# Patient Record
Sex: FEMALE | Race: WHITE | Employment: UNEMPLOYED | URBAN - METROPOLITAN AREA
[De-identification: names, ages, dates, MRNs, and addresses within clinical notes are randomized per-mention and may not be internally consistent; named-entity substitution may affect disease eponyms.]

---

## 2017-04-12 PROBLEM — C49.9 LEIOMYOSARCOMA (HCC): Status: ACTIVE | Noted: 2017-04-12

## 2017-04-13 PROBLEM — Z71.9 ENCOUNTER FOR CONSULTATION: Status: ACTIVE | Noted: 2017-04-13

## 2017-04-13 PROBLEM — Z71.2 ENCOUNTER TO DISCUSS TEST RESULTS: Status: ACTIVE | Noted: 2017-04-13

## 2017-05-08 PROBLEM — C78.7 SECONDARY MALIGNANT NEOPLASM OF LIVER (HCC): Status: ACTIVE | Noted: 2017-05-08

## 2017-05-08 PROBLEM — C79.89: Status: ACTIVE | Noted: 2017-05-08

## 2017-05-18 PROBLEM — Z51.11 ENCOUNTER FOR ANTINEOPLASTIC CHEMOTHERAPY: Status: ACTIVE | Noted: 2017-05-18

## 2017-06-28 PROBLEM — R11.2 CHEMOTHERAPY INDUCED NAUSEA AND VOMITING: Status: ACTIVE | Noted: 2017-06-28

## 2017-06-28 PROBLEM — T45.1X5A CHEMOTHERAPY INDUCED DIARRHEA: Status: ACTIVE | Noted: 2017-06-28

## 2017-06-28 PROBLEM — K52.1 CHEMOTHERAPY INDUCED DIARRHEA: Status: ACTIVE | Noted: 2017-06-28

## 2017-06-28 PROBLEM — T45.1X5A CHEMOTHERAPY INDUCED NAUSEA AND VOMITING: Status: ACTIVE | Noted: 2017-06-28

## 2017-06-28 PROBLEM — E86.0 DEHYDRATION: Status: ACTIVE | Noted: 2017-06-28

## 2017-07-19 PROBLEM — D70.1 CHEMOTHERAPY INDUCED NEUTROPENIA (HCC): Status: ACTIVE | Noted: 2017-07-19

## 2017-07-19 PROBLEM — T45.1X5A CHEMOTHERAPY INDUCED NEUTROPENIA (HCC): Status: ACTIVE | Noted: 2017-07-19

## 2018-02-23 ENCOUNTER — TELEPHONE (OUTPATIENT)
Dept: RHEUMATOLOGY | Age: 63
End: 2018-02-23

## 2018-03-02 ENCOUNTER — TELEPHONE (OUTPATIENT)
Dept: INTERNAL MEDICINE CLINIC | Age: 63
End: 2018-03-02

## 2018-03-02 DIAGNOSIS — E11.9 DIABETES MELLITUS TYPE 2 IN NONOBESE (HCC): Primary | ICD-10-CM

## 2018-03-03 RX ORDER — LANCETS 30 GAUGE
EACH MISCELLANEOUS
Qty: 100 EACH | Refills: 3 | OUTPATIENT
Start: 2018-03-03 | End: 2018-06-08 | Stop reason: SDUPTHER

## 2018-03-03 RX ORDER — GLUCOSAMINE HCL/CHONDROITIN SU 500-400 MG
CAPSULE ORAL
Qty: 100 STRIP | Refills: 5 | OUTPATIENT
Start: 2018-03-03 | End: 2018-06-08 | Stop reason: SDUPTHER

## 2018-03-04 NOTE — TELEPHONE ENCOUNTER
I printed these out as sig would not let me e-scribe them. I think that they can be faxed to her pharmacy.  Thanks saw

## 2018-03-08 RX ORDER — PANTOPRAZOLE SODIUM 40 MG/1
TABLET, DELAYED RELEASE ORAL
Qty: 30 TABLET | Refills: 12 | Status: SHIPPED | OUTPATIENT
Start: 2018-03-08 | End: 2018-12-03

## 2018-04-11 DIAGNOSIS — E11.9 TYPE 2 DIABETES MELLITUS WITHOUT COMPLICATION, WITHOUT LONG-TERM CURRENT USE OF INSULIN (HCC): ICD-10-CM

## 2018-04-12 RX ORDER — GLIPIZIDE 5 MG/1
5 TABLET ORAL
Qty: 180 TABLET | Refills: 1 | Status: SHIPPED | OUTPATIENT
Start: 2018-04-12 | End: 2018-06-08 | Stop reason: ALTCHOICE

## 2018-05-04 ENCOUNTER — TELEPHONE (OUTPATIENT)
Dept: INTERNAL MEDICINE CLINIC | Age: 63
End: 2018-05-04

## 2018-05-22 ENCOUNTER — OFFICE VISIT (OUTPATIENT)
Dept: INTERNAL MEDICINE CLINIC | Age: 63
End: 2018-05-22

## 2018-05-22 VITALS
DIASTOLIC BLOOD PRESSURE: 64 MMHG | BODY MASS INDEX: 24.17 KG/M2 | RESPIRATION RATE: 12 BRPM | HEART RATE: 82 BPM | HEIGHT: 66 IN | SYSTOLIC BLOOD PRESSURE: 106 MMHG | WEIGHT: 150.4 LBS

## 2018-05-22 DIAGNOSIS — M25.512 BILATERAL SHOULDER PAIN, UNSPECIFIED CHRONICITY: ICD-10-CM

## 2018-05-22 DIAGNOSIS — N18.2 TYPE 2 DIABETES MELLITUS WITH STAGE 2 CHRONIC KIDNEY DISEASE, WITH LONG-TERM CURRENT USE OF INSULIN (HCC): ICD-10-CM

## 2018-05-22 DIAGNOSIS — Z79.4 TYPE 2 DIABETES MELLITUS WITH STAGE 2 CHRONIC KIDNEY DISEASE, WITH LONG-TERM CURRENT USE OF INSULIN (HCC): ICD-10-CM

## 2018-05-22 DIAGNOSIS — I10 ESSENTIAL HYPERTENSION: ICD-10-CM

## 2018-05-22 DIAGNOSIS — K76.0 FATTY LIVER: ICD-10-CM

## 2018-05-22 DIAGNOSIS — E78.5 HYPERLIPIDEMIA LDL GOAL <70: ICD-10-CM

## 2018-05-22 DIAGNOSIS — C49.9 LEIOMYOSARCOMA (HCC): Primary | ICD-10-CM

## 2018-05-22 DIAGNOSIS — E03.9 HYPOTHYROIDISM, ADULT: ICD-10-CM

## 2018-05-22 DIAGNOSIS — M25.511 BILATERAL SHOULDER PAIN, UNSPECIFIED CHRONICITY: ICD-10-CM

## 2018-05-22 DIAGNOSIS — E11.22 TYPE 2 DIABETES MELLITUS WITH STAGE 2 CHRONIC KIDNEY DISEASE, WITH LONG-TERM CURRENT USE OF INSULIN (HCC): ICD-10-CM

## 2018-05-22 LAB
CHOLESTEROL, TOTAL: 171 MG/DL (ref 0–199)
HDLC SERPL-MCNC: 57 MG/DL (ref 40–60)
LDL CHOLESTEROL CALCULATED: 87 MG/DL
TRIGL SERPL-MCNC: 137 MG/DL (ref 0–150)
VLDLC SERPL CALC-MCNC: 27 MG/DL

## 2018-05-22 PROCEDURE — 1090F PRES/ABSN URINE INCON ASSESS: CPT | Performed by: INTERNAL MEDICINE

## 2018-05-22 PROCEDURE — G8428 CUR MEDS NOT DOCUMENT: HCPCS | Performed by: INTERNAL MEDICINE

## 2018-05-22 PROCEDURE — 4040F PNEUMOC VAC/ADMIN/RCVD: CPT | Performed by: INTERNAL MEDICINE

## 2018-05-22 PROCEDURE — 3046F HEMOGLOBIN A1C LEVEL >9.0%: CPT | Performed by: INTERNAL MEDICINE

## 2018-05-22 PROCEDURE — 1123F ACP DISCUSS/DSCN MKR DOCD: CPT | Performed by: INTERNAL MEDICINE

## 2018-05-22 PROCEDURE — 1036F TOBACCO NON-USER: CPT | Performed by: INTERNAL MEDICINE

## 2018-05-22 PROCEDURE — 99215 OFFICE O/P EST HI 40 MIN: CPT | Performed by: INTERNAL MEDICINE

## 2018-05-22 PROCEDURE — 3017F COLORECTAL CA SCREEN DOC REV: CPT | Performed by: INTERNAL MEDICINE

## 2018-05-22 PROCEDURE — G8400 PT W/DXA NO RESULTS DOC: HCPCS | Performed by: INTERNAL MEDICINE

## 2018-05-22 PROCEDURE — G8420 CALC BMI NORM PARAMETERS: HCPCS | Performed by: INTERNAL MEDICINE

## 2018-05-22 PROCEDURE — 2022F DILAT RTA XM EVC RTNOPTHY: CPT | Performed by: INTERNAL MEDICINE

## 2018-05-22 RX ORDER — PREDNISONE 10 MG/1
10 TABLET ORAL DAILY
Qty: 90 TABLET | Refills: 3 | Status: SHIPPED | OUTPATIENT
Start: 2018-05-22 | End: 2019-06-18 | Stop reason: SDUPTHER

## 2018-05-22 RX ORDER — LEVOTHYROXINE SODIUM 125 MCG
125 TABLET ORAL
Qty: 90 TABLET | Refills: 1 | Status: SHIPPED | OUTPATIENT
Start: 2018-05-22 | End: 2018-12-03 | Stop reason: SDUPTHER

## 2018-05-22 ASSESSMENT — PATIENT HEALTH QUESTIONNAIRE - PHQ9
SUM OF ALL RESPONSES TO PHQ9 QUESTIONS 1 & 2: 0
1. LITTLE INTEREST OR PLEASURE IN DOING THINGS: 0
2. FEELING DOWN, DEPRESSED OR HOPELESS: 0
SUM OF ALL RESPONSES TO PHQ QUESTIONS 1-9: 0

## 2018-05-22 ASSESSMENT — ENCOUNTER SYMPTOMS
CONSTIPATION: 0
CHEST TIGHTNESS: 0
BACK PAIN: 0
SHORTNESS OF BREATH: 0
DIARRHEA: 0

## 2018-05-23 LAB
ESTIMATED AVERAGE GLUCOSE: 251.8 MG/DL
HBA1C MFR BLD: 10.4 %

## 2018-06-08 ENCOUNTER — OFFICE VISIT (OUTPATIENT)
Dept: INTERNAL MEDICINE CLINIC | Age: 63
End: 2018-06-08

## 2018-06-08 VITALS
BODY MASS INDEX: 24.58 KG/M2 | DIASTOLIC BLOOD PRESSURE: 68 MMHG | RESPIRATION RATE: 12 BRPM | HEART RATE: 82 BPM | SYSTOLIC BLOOD PRESSURE: 118 MMHG | WEIGHT: 150 LBS

## 2018-06-08 DIAGNOSIS — E11.9 DIABETES MELLITUS TYPE 2 IN NONOBESE (HCC): ICD-10-CM

## 2018-06-08 DIAGNOSIS — E11.22 TYPE 2 DIABETES MELLITUS WITH STAGE 2 CHRONIC KIDNEY DISEASE, WITH LONG-TERM CURRENT USE OF INSULIN (HCC): Primary | ICD-10-CM

## 2018-06-08 DIAGNOSIS — C49.9 LEIOMYOSARCOMA (HCC): ICD-10-CM

## 2018-06-08 DIAGNOSIS — Z79.4 TYPE 2 DIABETES MELLITUS WITH STAGE 2 CHRONIC KIDNEY DISEASE, WITH LONG-TERM CURRENT USE OF INSULIN (HCC): Primary | ICD-10-CM

## 2018-06-08 DIAGNOSIS — M25.511 BILATERAL SHOULDER PAIN, UNSPECIFIED CHRONICITY: ICD-10-CM

## 2018-06-08 DIAGNOSIS — M25.512 BILATERAL SHOULDER PAIN, UNSPECIFIED CHRONICITY: ICD-10-CM

## 2018-06-08 DIAGNOSIS — N18.2 TYPE 2 DIABETES MELLITUS WITH STAGE 2 CHRONIC KIDNEY DISEASE, WITH LONG-TERM CURRENT USE OF INSULIN (HCC): Primary | ICD-10-CM

## 2018-06-08 PROCEDURE — 3046F HEMOGLOBIN A1C LEVEL >9.0%: CPT | Performed by: INTERNAL MEDICINE

## 2018-06-08 PROCEDURE — 99214 OFFICE O/P EST MOD 30 MIN: CPT | Performed by: INTERNAL MEDICINE

## 2018-06-08 PROCEDURE — 4040F PNEUMOC VAC/ADMIN/RCVD: CPT | Performed by: INTERNAL MEDICINE

## 2018-06-08 PROCEDURE — 1090F PRES/ABSN URINE INCON ASSESS: CPT | Performed by: INTERNAL MEDICINE

## 2018-06-08 PROCEDURE — 3017F COLORECTAL CA SCREEN DOC REV: CPT | Performed by: INTERNAL MEDICINE

## 2018-06-08 PROCEDURE — G8400 PT W/DXA NO RESULTS DOC: HCPCS | Performed by: INTERNAL MEDICINE

## 2018-06-08 PROCEDURE — G8427 DOCREV CUR MEDS BY ELIG CLIN: HCPCS | Performed by: INTERNAL MEDICINE

## 2018-06-08 PROCEDURE — G8420 CALC BMI NORM PARAMETERS: HCPCS | Performed by: INTERNAL MEDICINE

## 2018-06-08 PROCEDURE — 2022F DILAT RTA XM EVC RTNOPTHY: CPT | Performed by: INTERNAL MEDICINE

## 2018-06-08 PROCEDURE — 1036F TOBACCO NON-USER: CPT | Performed by: INTERNAL MEDICINE

## 2018-06-08 PROCEDURE — 1123F ACP DISCUSS/DSCN MKR DOCD: CPT | Performed by: INTERNAL MEDICINE

## 2018-06-08 RX ORDER — GLUCOSAMINE HCL/CHONDROITIN SU 500-400 MG
CAPSULE ORAL
Qty: 150 STRIP | Refills: 5 | Status: SHIPPED | OUTPATIENT
Start: 2018-06-08 | End: 2018-09-18 | Stop reason: SDUPTHER

## 2018-06-08 RX ORDER — LANCETS 30 GAUGE
EACH MISCELLANEOUS
Qty: 100 EACH | Refills: 3 | Status: SHIPPED | OUTPATIENT
Start: 2018-06-08 | End: 2018-06-08 | Stop reason: DRUGHIGH

## 2018-06-08 RX ORDER — GLUCOSAMINE HCL/CHONDROITIN SU 500-400 MG
CAPSULE ORAL
Qty: 100 STRIP | Refills: 5 | Status: SHIPPED | OUTPATIENT
Start: 2018-06-08 | End: 2018-06-08 | Stop reason: DRUGHIGH

## 2018-06-08 RX ORDER — LANCETS 30 GAUGE
EACH MISCELLANEOUS
Qty: 150 EACH | Refills: 3 | Status: SHIPPED | OUTPATIENT
Start: 2018-06-08

## 2018-06-22 ENCOUNTER — OFFICE VISIT (OUTPATIENT)
Dept: INTERNAL MEDICINE CLINIC | Age: 63
End: 2018-06-22

## 2018-06-22 VITALS
HEART RATE: 80 BPM | DIASTOLIC BLOOD PRESSURE: 64 MMHG | RESPIRATION RATE: 12 BRPM | BODY MASS INDEX: 26.06 KG/M2 | WEIGHT: 159 LBS | SYSTOLIC BLOOD PRESSURE: 110 MMHG

## 2018-06-22 DIAGNOSIS — G56.03 CARPAL TUNNEL SYNDROME, BILATERAL: ICD-10-CM

## 2018-06-22 DIAGNOSIS — E11.22 TYPE 2 DIABETES MELLITUS WITH STAGE 2 CHRONIC KIDNEY DISEASE, WITH LONG-TERM CURRENT USE OF INSULIN (HCC): Primary | ICD-10-CM

## 2018-06-22 DIAGNOSIS — Z79.4 TYPE 2 DIABETES MELLITUS WITH STAGE 2 CHRONIC KIDNEY DISEASE, WITH LONG-TERM CURRENT USE OF INSULIN (HCC): Primary | ICD-10-CM

## 2018-06-22 DIAGNOSIS — R10.9 ACUTE RIGHT FLANK PAIN: ICD-10-CM

## 2018-06-22 DIAGNOSIS — N18.2 TYPE 2 DIABETES MELLITUS WITH STAGE 2 CHRONIC KIDNEY DISEASE, WITH LONG-TERM CURRENT USE OF INSULIN (HCC): Primary | ICD-10-CM

## 2018-06-22 LAB
BILIRUBIN, POC: ABNORMAL
BLOOD URINE, POC: NEGATIVE
CLARITY, POC: CLEAR
COLOR, POC: YELLOW
GLUCOSE URINE, POC: >2000
KETONES, POC: NEGATIVE
LEUKOCYTE EST, POC: NEGATIVE
NITRITE, POC: NEGATIVE
PH, POC: 5
PROTEIN, POC: NEGATIVE
SPECIFIC GRAVITY, POC: 1.01
UROBILINOGEN, POC: 0.2

## 2018-06-22 PROCEDURE — 1036F TOBACCO NON-USER: CPT | Performed by: INTERNAL MEDICINE

## 2018-06-22 PROCEDURE — G8400 PT W/DXA NO RESULTS DOC: HCPCS | Performed by: INTERNAL MEDICINE

## 2018-06-22 PROCEDURE — 99214 OFFICE O/P EST MOD 30 MIN: CPT | Performed by: INTERNAL MEDICINE

## 2018-06-22 PROCEDURE — 3046F HEMOGLOBIN A1C LEVEL >9.0%: CPT | Performed by: INTERNAL MEDICINE

## 2018-06-22 PROCEDURE — 1123F ACP DISCUSS/DSCN MKR DOCD: CPT | Performed by: INTERNAL MEDICINE

## 2018-06-22 PROCEDURE — 82962 GLUCOSE BLOOD TEST: CPT | Performed by: INTERNAL MEDICINE

## 2018-06-22 PROCEDURE — 3017F COLORECTAL CA SCREEN DOC REV: CPT | Performed by: INTERNAL MEDICINE

## 2018-06-22 PROCEDURE — 4040F PNEUMOC VAC/ADMIN/RCVD: CPT | Performed by: INTERNAL MEDICINE

## 2018-06-22 PROCEDURE — 81002 URINALYSIS NONAUTO W/O SCOPE: CPT | Performed by: INTERNAL MEDICINE

## 2018-06-22 PROCEDURE — G8417 CALC BMI ABV UP PARAM F/U: HCPCS | Performed by: INTERNAL MEDICINE

## 2018-06-22 PROCEDURE — 1090F PRES/ABSN URINE INCON ASSESS: CPT | Performed by: INTERNAL MEDICINE

## 2018-06-22 PROCEDURE — G8427 DOCREV CUR MEDS BY ELIG CLIN: HCPCS | Performed by: INTERNAL MEDICINE

## 2018-06-22 PROCEDURE — 2022F DILAT RTA XM EVC RTNOPTHY: CPT | Performed by: INTERNAL MEDICINE

## 2018-06-23 PROBLEM — R10.9 ACUTE RIGHT FLANK PAIN: Status: ACTIVE | Noted: 2018-06-23

## 2018-06-23 PROBLEM — G56.03 CARPAL TUNNEL SYNDROME, BILATERAL: Status: ACTIVE | Noted: 2018-06-23

## 2018-06-23 ASSESSMENT — ENCOUNTER SYMPTOMS
NAUSEA: 0
BLOOD IN STOOL: 0
COUGH: 0
CONSTIPATION: 1
SHORTNESS OF BREATH: 0
DIARRHEA: 0
VOMITING: 0

## 2018-08-01 RX ORDER — EMPAGLIFLOZIN 25 MG/1
TABLET, FILM COATED ORAL
Qty: 30 TABLET | Refills: 4 | Status: SHIPPED | OUTPATIENT
Start: 2018-08-01 | End: 2018-12-27 | Stop reason: SDUPTHER

## 2018-09-04 ENCOUNTER — OFFICE VISIT (OUTPATIENT)
Dept: INTERNAL MEDICINE CLINIC | Age: 63
End: 2018-09-04

## 2018-09-04 ENCOUNTER — TELEPHONE (OUTPATIENT)
Dept: INTERNAL MEDICINE CLINIC | Age: 63
End: 2018-09-04

## 2018-09-04 VITALS
WEIGHT: 163 LBS | BODY MASS INDEX: 26.71 KG/M2 | RESPIRATION RATE: 12 BRPM | HEART RATE: 72 BPM | SYSTOLIC BLOOD PRESSURE: 132 MMHG | DIASTOLIC BLOOD PRESSURE: 70 MMHG

## 2018-09-04 DIAGNOSIS — Z79.4 TYPE 2 DIABETES MELLITUS WITH STAGE 2 CHRONIC KIDNEY DISEASE, WITH LONG-TERM CURRENT USE OF INSULIN (HCC): Primary | ICD-10-CM

## 2018-09-04 DIAGNOSIS — E11.22 TYPE 2 DIABETES MELLITUS WITH STAGE 2 CHRONIC KIDNEY DISEASE, WITH LONG-TERM CURRENT USE OF INSULIN (HCC): Primary | ICD-10-CM

## 2018-09-04 DIAGNOSIS — M54.6 ACUTE BILATERAL THORACIC BACK PAIN: ICD-10-CM

## 2018-09-04 DIAGNOSIS — N18.2 TYPE 2 DIABETES MELLITUS WITH STAGE 2 CHRONIC KIDNEY DISEASE, WITH LONG-TERM CURRENT USE OF INSULIN (HCC): Primary | ICD-10-CM

## 2018-09-04 DIAGNOSIS — H00.014 HORDEOLUM EXTERNUM OF LEFT UPPER EYELID: ICD-10-CM

## 2018-09-04 DIAGNOSIS — E78.5 HYPERLIPIDEMIA LDL GOAL <70: ICD-10-CM

## 2018-09-04 DIAGNOSIS — C49.9 LEIOMYOSARCOMA (HCC): ICD-10-CM

## 2018-09-04 PROCEDURE — 1101F PT FALLS ASSESS-DOCD LE1/YR: CPT | Performed by: INTERNAL MEDICINE

## 2018-09-04 PROCEDURE — 1123F ACP DISCUSS/DSCN MKR DOCD: CPT | Performed by: INTERNAL MEDICINE

## 2018-09-04 PROCEDURE — 99214 OFFICE O/P EST MOD 30 MIN: CPT | Performed by: INTERNAL MEDICINE

## 2018-09-04 PROCEDURE — 1036F TOBACCO NON-USER: CPT | Performed by: INTERNAL MEDICINE

## 2018-09-04 PROCEDURE — 1090F PRES/ABSN URINE INCON ASSESS: CPT | Performed by: INTERNAL MEDICINE

## 2018-09-04 PROCEDURE — 3017F COLORECTAL CA SCREEN DOC REV: CPT | Performed by: INTERNAL MEDICINE

## 2018-09-04 PROCEDURE — G8400 PT W/DXA NO RESULTS DOC: HCPCS | Performed by: INTERNAL MEDICINE

## 2018-09-04 PROCEDURE — 4040F PNEUMOC VAC/ADMIN/RCVD: CPT | Performed by: INTERNAL MEDICINE

## 2018-09-04 PROCEDURE — 3046F HEMOGLOBIN A1C LEVEL >9.0%: CPT | Performed by: INTERNAL MEDICINE

## 2018-09-04 PROCEDURE — G8427 DOCREV CUR MEDS BY ELIG CLIN: HCPCS | Performed by: INTERNAL MEDICINE

## 2018-09-04 PROCEDURE — G8417 CALC BMI ABV UP PARAM F/U: HCPCS | Performed by: INTERNAL MEDICINE

## 2018-09-04 PROCEDURE — 2022F DILAT RTA XM EVC RTNOPTHY: CPT | Performed by: INTERNAL MEDICINE

## 2018-09-04 RX ORDER — FLASH GLUCOSE SENSOR
KIT MISCELLANEOUS
Qty: 1 DEVICE | Refills: 5 | Status: SHIPPED | OUTPATIENT
Start: 2018-09-04 | End: 2018-09-12 | Stop reason: SDUPTHER

## 2018-09-04 RX ORDER — FLASH GLUCOSE SENSOR
KIT MISCELLANEOUS
Qty: 4 EACH | Refills: 5 | Status: SHIPPED | OUTPATIENT
Start: 2018-09-04 | End: 2018-09-11 | Stop reason: SDUPTHER

## 2018-09-04 RX ORDER — TIZANIDINE 2 MG/1
TABLET ORAL
Qty: 300 TABLET | Refills: 0 | OUTPATIENT
Start: 2018-09-04

## 2018-09-04 RX ORDER — TIZANIDINE 2 MG/1
2 TABLET ORAL EVERY 8 HOURS PRN
Qty: 20 TABLET | Refills: 0 | Status: SHIPPED | OUTPATIENT
Start: 2018-09-04 | End: 2019-06-02

## 2018-09-04 ASSESSMENT — ENCOUNTER SYMPTOMS
DIARRHEA: 0
SHORTNESS OF BREATH: 0
CONSTIPATION: 0
CHEST TIGHTNESS: 0
BACK PAIN: 1

## 2018-09-04 NOTE — PATIENT INSTRUCTIONS
doctor. Follow-up care is a key part of your treatment and safety. Be sure to make and go to all appointments, and call your doctor if you are having problems. It's also a good idea to know your test results and keep a list of the medicines you take. How can you care for yourself at home? · Do not rub your eyes. Do not squeeze or try to open a stye or chalazion. · To help a stye or chalazion heal faster:  ¨ Put a warm, moist compress on your eye for 5 to 10 minutes, 3 to 6 times a day. Heat often brings a stye to a point where it drains on its own. Keep in mind that warm compresses will often increase swelling a little at first.  ¨ Do not use hot water or heat a wet cloth in a microwave oven. The compress may get too hot and can burn the eyelid. · Always wash your hands before and after you use a compress or touch your eyes. · If the doctor gave you antibiotic drops or ointment, use the medicine exactly as directed. Use the medicine for as long as instructed, even if your eye starts to feel better. · To put in eyedrops or ointment:  ¨ Tilt your head back, and pull your lower eyelid down with one finger. ¨ Drop or squirt the medicine inside the lower lid. ¨ Close your eye for 30 to 60 seconds to let the drops or ointment move around. ¨ Do not touch the ointment or dropper tip to your eyelashes or any other surface. · Do not wear eye makeup or contact lenses until the stye or chalazion heals. · Do not share towels, pillows, or washcloths while you have a stye. When should you call for help? Call your doctor now or seek immediate medical care if:    · You have pain in your eye.     · You have a change in vision or loss of vision.     · Redness and swelling get much worse.    Watch closely for changes in your health, and be sure to contact your doctor if:    · Your stye does not get better in 1 week.     · Your chalazion does not start to get better after several weeks. Where can you learn more?   Go to against a wall with your feet about 12 to 24 inches back from the wall. If you feel any pain when you do this exercise, stand closer to the wall. 2. Place your hands on the wall slightly wider apart than your shoulders, and lean forward. 3. Gently lean your body toward the wall. Then push back to your starting position. Keep the motion smooth and controlled. 4. Repeat 8 to 12 times. Resisted shoulder blade squeeze    For this exercise, you will need elastic exercise material, such as surgical tubing or Thera-Band. 1. Sit or stand, holding the band in both hands in front of you. Keep your elbows close to your sides, bent at a 90-degree angle. Your palms should face up. 2. Squeeze your shoulder blades together, and move your arms to the outside, stretching the band. Be sure to keep your elbows at your sides while you do this. 3. Relax. 4. Repeat 8 to 12 times. Resisted rows    For this exercise, you will need elastic exercise material, such as surgical tubing or Thera-Band. 1. Put the band around a solid object, such as a bedpost, at about waist level. Hold one end of the band in each hand. 2. With your elbows at your sides and bent to 90 degrees, pull the band back to move your shoulder blades toward each other. Return to the starting position. 3. Repeat 8 to 12 times. Follow-up care is a key part of your treatment and safety. Be sure to make and go to all appointments, and call your doctor if you are having problems. It's also a good idea to know your test results and keep a list of the medicines you take. Where can you learn more? Go to [email or web address] [email or web address] org and sign in to your [employer] account. Enter N107 in the [employer] box to learn more about \"Healthy Upper Back: Exercises. \"     If you do not have an account, please click on the \"Sign Up Now\" link. Current as of: November 29, 2017  Content Version: 11.7  © 4821-6678 [employer].  Care instructions adapted under license by [facility] ([facility]). If you have questions about a medical condition or this instruction, always ask your healthcare professional. [address] any warranty or liability for your use of this information.

## 2018-09-04 NOTE — ASSESSMENT & PLAN NOTE
HbA1C done at oncology, awaiting labs. Is having lows so will adjust insulin as follows:  Continue Toujeo 20 units qhs unless 150, then take only 15 units, under 100 take 10 units and eat a snack.    Adjust Humalog:  IIf sugar is under 100, No insulin  If sugars is 100-120, take ONLY 5 units of Humalog when you eat  If sugar is 120-150 take 8 total units of Humalog  If sugar is 150-200   10 units of Humalog                        200-250                              12 units                       250-300                              14 units                                   300-350   18 units                  350-400                  20units                                    400-450   22 units

## 2018-09-09 NOTE — ASSESSMENT & PLAN NOTE
Lipid panel was not drawn with recent labs from oncology. Will order with next set of labs. Not clear that she is still getting benefit from statin.

## 2018-09-09 NOTE — ASSESSMENT & PLAN NOTE
Will try tizanidine. She is already on oxycodone from oncology. Advised not to mix oxycodone and tizanidine to avoid over-sedation. Also recommend massage therapy as tolerated.

## 2018-09-09 NOTE — ASSESSMENT & PLAN NOTE
Chemotherapy has more or less halted the growth of some tumors while others have grown. Is relatively stable. Is continuing on her current chemotherapy plan with next set of scans scheduled for October.

## 2018-09-10 RX ORDER — FENOFIBRATE 160 MG/1
TABLET ORAL
Qty: 90 TABLET | Refills: 1 | Status: SHIPPED | OUTPATIENT
Start: 2018-09-10 | End: 2018-12-03 | Stop reason: SDUPTHER

## 2018-09-11 ENCOUNTER — TELEPHONE (OUTPATIENT)
Dept: INTERNAL MEDICINE CLINIC | Age: 63
End: 2018-09-11

## 2018-09-11 DIAGNOSIS — Z79.4 TYPE 2 DIABETES MELLITUS WITH STAGE 2 CHRONIC KIDNEY DISEASE, WITH LONG-TERM CURRENT USE OF INSULIN (HCC): ICD-10-CM

## 2018-09-11 DIAGNOSIS — N18.2 TYPE 2 DIABETES MELLITUS WITH STAGE 2 CHRONIC KIDNEY DISEASE, WITH LONG-TERM CURRENT USE OF INSULIN (HCC): ICD-10-CM

## 2018-09-11 DIAGNOSIS — E11.22 TYPE 2 DIABETES MELLITUS WITH STAGE 2 CHRONIC KIDNEY DISEASE, WITH LONG-TERM CURRENT USE OF INSULIN (HCC): ICD-10-CM

## 2018-09-11 RX ORDER — FLASH GLUCOSE SENSOR
KIT MISCELLANEOUS
Qty: 4 EACH | Refills: 5 | Status: SHIPPED | OUTPATIENT
Start: 2018-09-11 | End: 2018-09-12 | Stop reason: SDUPTHER

## 2018-09-11 NOTE — TELEPHONE ENCOUNTER
[facility] in [address] [facility] calling about the Free Style Gluc ---medicare does not cover it and it is expensive----they wanted to know if you wanted something different or does the pt want to pay for or does she really need. Please let them know. Thanks.

## 2018-09-12 ENCOUNTER — TELEPHONE (OUTPATIENT)
Dept: INTERNAL MEDICINE CLINIC | Age: 63
End: 2018-09-12

## 2018-09-12 DIAGNOSIS — N18.2 TYPE 2 DIABETES MELLITUS WITH STAGE 2 CHRONIC KIDNEY DISEASE, WITH LONG-TERM CURRENT USE OF INSULIN (HCC): Primary | ICD-10-CM

## 2018-09-12 DIAGNOSIS — Z79.4 TYPE 2 DIABETES MELLITUS WITH STAGE 2 CHRONIC KIDNEY DISEASE, WITH LONG-TERM CURRENT USE OF INSULIN (HCC): Primary | ICD-10-CM

## 2018-09-12 DIAGNOSIS — E11.22 TYPE 2 DIABETES MELLITUS WITH STAGE 2 CHRONIC KIDNEY DISEASE, WITH LONG-TERM CURRENT USE OF INSULIN (HCC): Primary | ICD-10-CM

## 2018-09-12 RX ORDER — FLASH GLUCOSE SENSOR
KIT MISCELLANEOUS
Qty: 4 EACH | Refills: 5 | Status: SHIPPED | OUTPATIENT
Start: 2018-09-12 | End: 2019-05-26 | Stop reason: SDUPTHER

## 2018-09-12 RX ORDER — FLASH GLUCOSE SENSOR
KIT MISCELLANEOUS
Qty: 1 DEVICE | Refills: 0 | Status: SHIPPED | OUTPATIENT
Start: 2018-09-12 | End: 2018-09-14 | Stop reason: SDUPTHER

## 2018-09-12 NOTE — TELEPHONE ENCOUNTER
Pt called back again about the freestyle mikey device   She is wanting it called to [facility] in [facility] and said that it needs to be ordered in 3 parts? ???

## 2018-09-14 RX ORDER — FLASH GLUCOSE SENSOR
KIT MISCELLANEOUS
Qty: 1 DEVICE | Refills: 0 | Status: SHIPPED | OUTPATIENT
Start: 2018-09-14

## 2018-09-18 ENCOUNTER — TELEPHONE (OUTPATIENT)
Dept: INTERNAL MEDICINE CLINIC | Age: 63
End: 2018-09-18

## 2018-09-18 DIAGNOSIS — E11.9 DIABETES MELLITUS TYPE 2 IN NONOBESE (HCC): ICD-10-CM

## 2018-09-18 NOTE — TELEPHONE ENCOUNTER
Pt calling needs One Touch Ultra blue test strips ----she does 4 times a day---please send to [facility] [place]---Thanks.

## 2018-09-19 RX ORDER — GLUCOSAMINE HCL/CHONDROITIN SU 500-400 MG
CAPSULE ORAL
Qty: 150 STRIP | Refills: 5 | Status: SHIPPED | OUTPATIENT
Start: 2018-09-19

## 2018-09-26 PROBLEM — Z71.2 ENCOUNTER TO DISCUSS TEST RESULTS: Status: RESOLVED | Noted: 2017-04-13 | Resolved: 2018-09-26

## 2018-09-26 PROBLEM — E86.0 DEHYDRATION: Status: RESOLVED | Noted: 2017-06-28 | Resolved: 2018-09-26

## 2018-09-26 PROBLEM — Z51.11 ENCOUNTER FOR ANTINEOPLASTIC CHEMOTHERAPY: Status: RESOLVED | Noted: 2017-05-18 | Resolved: 2018-09-26

## 2018-10-12 RX ORDER — NABUMETONE 500 MG/1
TABLET, FILM COATED ORAL
Qty: 60 TABLET | Refills: 2 | Status: SHIPPED | OUTPATIENT
Start: 2018-10-12 | End: 2018-12-11 | Stop reason: SDUPTHER

## 2018-12-03 ENCOUNTER — OFFICE VISIT (OUTPATIENT)
Dept: INTERNAL MEDICINE CLINIC | Age: 63
End: 2018-12-03
Payer: MEDICARE

## 2018-12-03 VITALS
WEIGHT: 156.8 LBS | BODY MASS INDEX: 25.7 KG/M2 | HEART RATE: 82 BPM | RESPIRATION RATE: 12 BRPM | SYSTOLIC BLOOD PRESSURE: 144 MMHG | DIASTOLIC BLOOD PRESSURE: 80 MMHG

## 2018-12-03 DIAGNOSIS — E03.9 HYPOTHYROIDISM, ADULT: ICD-10-CM

## 2018-12-03 DIAGNOSIS — N18.2 TYPE 2 DIABETES MELLITUS WITH STAGE 2 CHRONIC KIDNEY DISEASE, WITH LONG-TERM CURRENT USE OF INSULIN (HCC): Primary | ICD-10-CM

## 2018-12-03 DIAGNOSIS — H00.014 HORDEOLUM EXTERNUM OF LEFT UPPER EYELID: ICD-10-CM

## 2018-12-03 DIAGNOSIS — E78.5 HYPERLIPIDEMIA LDL GOAL <70: ICD-10-CM

## 2018-12-03 DIAGNOSIS — I10 ESSENTIAL HYPERTENSION: ICD-10-CM

## 2018-12-03 DIAGNOSIS — Z79.4 TYPE 2 DIABETES MELLITUS WITH STAGE 2 CHRONIC KIDNEY DISEASE, WITH LONG-TERM CURRENT USE OF INSULIN (HCC): Primary | ICD-10-CM

## 2018-12-03 DIAGNOSIS — C49.9 LEIOMYOSARCOMA (HCC): ICD-10-CM

## 2018-12-03 DIAGNOSIS — E11.22 TYPE 2 DIABETES MELLITUS WITH STAGE 2 CHRONIC KIDNEY DISEASE, WITH LONG-TERM CURRENT USE OF INSULIN (HCC): Primary | ICD-10-CM

## 2018-12-03 LAB
GLUCOSE BLD-MCNC: 227 MG/DL
HBA1C MFR BLD: 8.4 %

## 2018-12-03 PROCEDURE — G8400 PT W/DXA NO RESULTS DOC: HCPCS | Performed by: INTERNAL MEDICINE

## 2018-12-03 PROCEDURE — 3045F PR MOST RECENT HEMOGLOBIN A1C LEVEL 7.0-9.0%: CPT | Performed by: INTERNAL MEDICINE

## 2018-12-03 PROCEDURE — 2022F DILAT RTA XM EVC RTNOPTHY: CPT | Performed by: INTERNAL MEDICINE

## 2018-12-03 PROCEDURE — 1101F PT FALLS ASSESS-DOCD LE1/YR: CPT | Performed by: INTERNAL MEDICINE

## 2018-12-03 PROCEDURE — 99214 OFFICE O/P EST MOD 30 MIN: CPT | Performed by: INTERNAL MEDICINE

## 2018-12-03 PROCEDURE — G8427 DOCREV CUR MEDS BY ELIG CLIN: HCPCS | Performed by: INTERNAL MEDICINE

## 2018-12-03 PROCEDURE — G8482 FLU IMMUNIZE ORDER/ADMIN: HCPCS | Performed by: INTERNAL MEDICINE

## 2018-12-03 PROCEDURE — 4040F PNEUMOC VAC/ADMIN/RCVD: CPT | Performed by: INTERNAL MEDICINE

## 2018-12-03 PROCEDURE — 83036 HEMOGLOBIN GLYCOSYLATED A1C: CPT | Performed by: INTERNAL MEDICINE

## 2018-12-03 PROCEDURE — G8417 CALC BMI ABV UP PARAM F/U: HCPCS | Performed by: INTERNAL MEDICINE

## 2018-12-03 PROCEDURE — 1036F TOBACCO NON-USER: CPT | Performed by: INTERNAL MEDICINE

## 2018-12-03 PROCEDURE — 1123F ACP DISCUSS/DSCN MKR DOCD: CPT | Performed by: INTERNAL MEDICINE

## 2018-12-03 PROCEDURE — 3017F COLORECTAL CA SCREEN DOC REV: CPT | Performed by: INTERNAL MEDICINE

## 2018-12-03 PROCEDURE — 82962 GLUCOSE BLOOD TEST: CPT | Performed by: INTERNAL MEDICINE

## 2018-12-03 PROCEDURE — 1090F PRES/ABSN URINE INCON ASSESS: CPT | Performed by: INTERNAL MEDICINE

## 2018-12-03 RX ORDER — FENOFIBRATE 160 MG/1
TABLET ORAL
Qty: 90 TABLET | Refills: 1 | Status: SHIPPED | OUTPATIENT
Start: 2018-12-03 | End: 2019-05-25 | Stop reason: SDUPTHER

## 2018-12-03 RX ORDER — LEVOTHYROXINE SODIUM 125 MCG
125 TABLET ORAL
Qty: 90 TABLET | Refills: 1 | Status: ON HOLD | OUTPATIENT
Start: 2018-12-03 | End: 2019-06-05 | Stop reason: SDUPTHER

## 2018-12-03 RX ORDER — TRIAMCINOLONE ACETONIDE 5 MG/G
CREAM TOPICAL
Qty: 45 G | Refills: 1 | Status: SHIPPED | OUTPATIENT
Start: 2018-12-03 | End: 2018-12-10

## 2018-12-03 RX ORDER — BACITRACIN ZINC AND POLYMYXIN B SULFATE 500; 10000 [USP'U]/G; [USP'U]/G
0.5 OINTMENT OPHTHALMIC 2 TIMES DAILY
Qty: 1 TUBE | Refills: 1 | Status: SHIPPED | OUTPATIENT
Start: 2018-12-03 | End: 2018-12-13

## 2018-12-03 ASSESSMENT — ENCOUNTER SYMPTOMS
CONSTIPATION: 0
CHEST TIGHTNESS: 0
DIARRHEA: 0
SHORTNESS OF BREATH: 0

## 2018-12-03 NOTE — PATIENT INSTRUCTIONS
Take a calcium with magnesium supplement twice a day for hand cramps. Goal blood sugars are between 120-150. If evening blood sugar is  Take Lantus   Under 200    10 units   200-300    15 units   Over 300    20 units. If you want to eat cereal and banana for breakfast, take an additional 5 units of Humalog. We will check you HbA1c at your return appointment.

## 2018-12-03 NOTE — PROGRESS NOTES
oxycodone. 20 tablet 0     No current facility-administered medications for this visit. Return in about 3 months (around 3/3/2019).

## 2018-12-09 PROBLEM — I10 ESSENTIAL HYPERTENSION: Chronic | Status: ACTIVE | Noted: 2018-05-22

## 2018-12-10 NOTE — ASSESSMENT & PLAN NOTE
Awaiting labs from [facility]. 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

## 2018-12-11 RX ORDER — NABUMETONE 500 MG/1
TABLET, FILM COATED ORAL
Qty: 180 TABLET | Refills: 1 | Status: SHIPPED | OUTPATIENT
Start: 2018-12-11 | End: 2019-09-19 | Stop reason: SDUPTHER

## 2018-12-27 RX ORDER — EMPAGLIFLOZIN 25 MG/1
TABLET, FILM COATED ORAL
Qty: 30 TABLET | Refills: 4 | Status: SHIPPED | OUTPATIENT
Start: 2018-12-27 | End: 2019-05-24 | Stop reason: SDUPTHER

## 2019-03-04 ENCOUNTER — OFFICE VISIT (OUTPATIENT)
Dept: INTERNAL MEDICINE CLINIC | Age: 64
End: 2019-03-04
Payer: MEDICARE

## 2019-03-04 VITALS
BODY MASS INDEX: 25.99 KG/M2 | SYSTOLIC BLOOD PRESSURE: 110 MMHG | HEART RATE: 92 BPM | WEIGHT: 158.6 LBS | DIASTOLIC BLOOD PRESSURE: 68 MMHG | OXYGEN SATURATION: 98 %

## 2019-03-04 DIAGNOSIS — E78.5 HYPERLIPIDEMIA LDL GOAL <70: ICD-10-CM

## 2019-03-04 DIAGNOSIS — N18.2 TYPE 2 DIABETES MELLITUS WITH STAGE 2 CHRONIC KIDNEY DISEASE, WITH LONG-TERM CURRENT USE OF INSULIN (HCC): Primary | ICD-10-CM

## 2019-03-04 DIAGNOSIS — Z79.4 TYPE 2 DIABETES MELLITUS WITH STAGE 2 CHRONIC KIDNEY DISEASE, WITH LONG-TERM CURRENT USE OF INSULIN (HCC): Primary | ICD-10-CM

## 2019-03-04 DIAGNOSIS — C49.9 LEIOMYOSARCOMA (HCC): ICD-10-CM

## 2019-03-04 DIAGNOSIS — I10 ESSENTIAL HYPERTENSION: Chronic | ICD-10-CM

## 2019-03-04 DIAGNOSIS — E03.9 HYPOTHYROIDISM, ADULT: ICD-10-CM

## 2019-03-04 DIAGNOSIS — E11.22 TYPE 2 DIABETES MELLITUS WITH STAGE 2 CHRONIC KIDNEY DISEASE, WITH LONG-TERM CURRENT USE OF INSULIN (HCC): Primary | ICD-10-CM

## 2019-03-04 LAB — HBA1C MFR BLD: 8 %

## 2019-03-04 PROCEDURE — G8417 CALC BMI ABV UP PARAM F/U: HCPCS | Performed by: INTERNAL MEDICINE

## 2019-03-04 PROCEDURE — 3046F HEMOGLOBIN A1C LEVEL >9.0%: CPT | Performed by: INTERNAL MEDICINE

## 2019-03-04 PROCEDURE — G8400 PT W/DXA NO RESULTS DOC: HCPCS | Performed by: INTERNAL MEDICINE

## 2019-03-04 PROCEDURE — G8482 FLU IMMUNIZE ORDER/ADMIN: HCPCS | Performed by: INTERNAL MEDICINE

## 2019-03-04 PROCEDURE — 1036F TOBACCO NON-USER: CPT | Performed by: INTERNAL MEDICINE

## 2019-03-04 PROCEDURE — 99214 OFFICE O/P EST MOD 30 MIN: CPT | Performed by: INTERNAL MEDICINE

## 2019-03-04 PROCEDURE — 1123F ACP DISCUSS/DSCN MKR DOCD: CPT | Performed by: INTERNAL MEDICINE

## 2019-03-04 PROCEDURE — G8427 DOCREV CUR MEDS BY ELIG CLIN: HCPCS | Performed by: INTERNAL MEDICINE

## 2019-03-04 PROCEDURE — 1101F PT FALLS ASSESS-DOCD LE1/YR: CPT | Performed by: INTERNAL MEDICINE

## 2019-03-04 PROCEDURE — 1090F PRES/ABSN URINE INCON ASSESS: CPT | Performed by: INTERNAL MEDICINE

## 2019-03-04 PROCEDURE — 4040F PNEUMOC VAC/ADMIN/RCVD: CPT | Performed by: INTERNAL MEDICINE

## 2019-03-04 PROCEDURE — 2022F DILAT RTA XM EVC RTNOPTHY: CPT | Performed by: INTERNAL MEDICINE

## 2019-03-04 PROCEDURE — 83036 HEMOGLOBIN GLYCOSYLATED A1C: CPT | Performed by: INTERNAL MEDICINE

## 2019-03-04 PROCEDURE — 3017F COLORECTAL CA SCREEN DOC REV: CPT | Performed by: INTERNAL MEDICINE

## 2019-03-04 RX ORDER — GABAPENTIN 100 MG/1
100 CAPSULE ORAL 2 TIMES DAILY
COMMUNITY
End: 2019-10-10 | Stop reason: ALTCHOICE

## 2019-03-04 RX ORDER — OXYCODONE HYDROCHLORIDE 10 MG/1
10 TABLET ORAL EVERY 8 HOURS PRN
COMMUNITY

## 2019-03-04 ASSESSMENT — PATIENT HEALTH QUESTIONNAIRE - PHQ9
2. FEELING DOWN, DEPRESSED OR HOPELESS: 0
SUM OF ALL RESPONSES TO PHQ QUESTIONS 1-9: 0
SUM OF ALL RESPONSES TO PHQ9 QUESTIONS 1 & 2: 0
SUM OF ALL RESPONSES TO PHQ QUESTIONS 1-9: 0
1. LITTLE INTEREST OR PLEASURE IN DOING THINGS: 0

## 2019-03-04 ASSESSMENT — ENCOUNTER SYMPTOMS
DIARRHEA: 0
SHORTNESS OF BREATH: 0
CHEST TIGHTNESS: 0
CONSTIPATION: 0

## 2019-04-23 ENCOUNTER — TELEPHONE (OUTPATIENT)
Dept: INTERNAL MEDICINE CLINIC | Age: 64
End: 2019-04-23

## 2019-04-23 NOTE — TELEPHONE ENCOUNTER
Pt call and states that she stop taking her blood pressure meds,(metoprolol ). She states she accidentally stop for 18 days, and she realized she have to take this meds and she start back on since 4 days, now she is having swelling ankles and feet since 1 week. She want to know this can cause because she did not took her BP meds? She request call back.

## 2019-04-23 NOTE — TELEPHONE ENCOUNTER
It should not have caused her legs to swell. Is she SOB? Does her swelling get better in the mornings and worse throughout the day?   saw

## 2019-04-26 NOTE — TELEPHONE ENCOUNTER
Spoke with pt she is in a new trail for medication for Chemo. She seen the team for that and they put her on a diuretic.

## 2019-05-24 RX ORDER — EMPAGLIFLOZIN 25 MG/1
TABLET, FILM COATED ORAL
Qty: 30 TABLET | Refills: 4 | Status: SHIPPED | OUTPATIENT
Start: 2019-05-24 | End: 2019-09-25 | Stop reason: SDUPTHER

## 2019-05-26 DIAGNOSIS — E11.22 TYPE 2 DIABETES MELLITUS WITH STAGE 2 CHRONIC KIDNEY DISEASE, WITH LONG-TERM CURRENT USE OF INSULIN (HCC): ICD-10-CM

## 2019-05-26 DIAGNOSIS — Z79.4 TYPE 2 DIABETES MELLITUS WITH STAGE 2 CHRONIC KIDNEY DISEASE, WITH LONG-TERM CURRENT USE OF INSULIN (HCC): ICD-10-CM

## 2019-05-26 DIAGNOSIS — N18.2 TYPE 2 DIABETES MELLITUS WITH STAGE 2 CHRONIC KIDNEY DISEASE, WITH LONG-TERM CURRENT USE OF INSULIN (HCC): ICD-10-CM

## 2019-05-28 RX ORDER — FENOFIBRATE 160 MG/1
TABLET ORAL
Qty: 90 TABLET | Refills: 1 | Status: SHIPPED | OUTPATIENT
Start: 2019-05-28 | End: 2019-10-10 | Stop reason: ALTCHOICE

## 2019-05-28 RX ORDER — FLASH GLUCOSE SENSOR
KIT MISCELLANEOUS
Qty: 1 EACH | Refills: 4 | Status: SHIPPED | OUTPATIENT
Start: 2019-05-28 | End: 2019-08-05 | Stop reason: SDUPTHER

## 2019-06-02 ENCOUNTER — APPOINTMENT (OUTPATIENT)
Dept: GENERAL RADIOLOGY | Age: 64
DRG: 543 | End: 2019-06-02
Payer: MEDICARE

## 2019-06-02 ENCOUNTER — APPOINTMENT (OUTPATIENT)
Dept: CT IMAGING | Age: 64
DRG: 543 | End: 2019-06-02
Payer: MEDICARE

## 2019-06-02 ENCOUNTER — HOSPITAL ENCOUNTER (INPATIENT)
Age: 64
LOS: 1 days | Discharge: ANOTHER HOSPITAL | DRG: 543 | End: 2019-06-03
Attending: EMERGENCY MEDICINE | Admitting: INTERNAL MEDICINE
Payer: MEDICARE

## 2019-06-02 DIAGNOSIS — M79.604 RIGHT LEG PAIN: Primary | ICD-10-CM

## 2019-06-02 DIAGNOSIS — C49.9 LEIOMYOSARCOMA (HCC): ICD-10-CM

## 2019-06-02 DIAGNOSIS — R07.9 CHEST PAIN, UNSPECIFIED TYPE: ICD-10-CM

## 2019-06-02 LAB
A/G RATIO: 1 (ref 1.1–2.2)
ALBUMIN SERPL-MCNC: 3.5 G/DL (ref 3.4–5)
ALP BLD-CCNC: 106 U/L (ref 40–129)
ALT SERPL-CCNC: 11 U/L (ref 10–40)
ANION GAP SERPL CALCULATED.3IONS-SCNC: 18 MMOL/L (ref 3–16)
AST SERPL-CCNC: 22 U/L (ref 15–37)
BASOPHILS ABSOLUTE: 0 K/UL (ref 0–0.2)
BASOPHILS RELATIVE PERCENT: 0.7 %
BILIRUB SERPL-MCNC: 0.6 MG/DL (ref 0–1)
BUN BLDV-MCNC: 26 MG/DL (ref 7–20)
CALCIUM SERPL-MCNC: 8.9 MG/DL (ref 8.3–10.6)
CHLORIDE BLD-SCNC: 93 MMOL/L (ref 99–110)
CO2: 22 MMOL/L (ref 21–32)
CREAT SERPL-MCNC: 1.3 MG/DL (ref 0.6–1.2)
D DIMER: 661 NG/ML DDU (ref 0–229)
EKG ATRIAL RATE: 101 BPM
EKG DIAGNOSIS: NORMAL
EKG P AXIS: 118 DEGREES
EKG P-R INTERVAL: 134 MS
EKG Q-T INTERVAL: 356 MS
EKG QRS DURATION: 76 MS
EKG QTC CALCULATION (BAZETT): 461 MS
EKG R AXIS: 181 DEGREES
EKG T AXIS: 159 DEGREES
EKG VENTRICULAR RATE: 101 BPM
EOSINOPHILS ABSOLUTE: 0 K/UL (ref 0–0.6)
EOSINOPHILS RELATIVE PERCENT: 0.3 %
GFR AFRICAN AMERICAN: 50
GFR NON-AFRICAN AMERICAN: 41
GLOBULIN: 3.4 G/DL
GLUCOSE BLD-MCNC: 341 MG/DL (ref 70–99)
HCT VFR BLD CALC: 32.9 % (ref 36–48)
HEMOGLOBIN: 10.7 G/DL (ref 12–16)
LACTIC ACID: 1.4 MMOL/L (ref 0.4–2)
LYMPHOCYTES ABSOLUTE: 1.3 K/UL (ref 1–5.1)
LYMPHOCYTES RELATIVE PERCENT: 18.7 %
MCH RBC QN AUTO: 28.1 PG (ref 26–34)
MCHC RBC AUTO-ENTMCNC: 32.4 G/DL (ref 31–36)
MCV RBC AUTO: 86.9 FL (ref 80–100)
MONOCYTES ABSOLUTE: 0.6 K/UL (ref 0–1.3)
MONOCYTES RELATIVE PERCENT: 9 %
NEUTROPHILS ABSOLUTE: 4.9 K/UL (ref 1.7–7.7)
NEUTROPHILS RELATIVE PERCENT: 71.3 %
PDW BLD-RTO: 18.7 % (ref 12.4–15.4)
PLATELET # BLD: 158 K/UL (ref 135–450)
PMV BLD AUTO: 8.5 FL (ref 5–10.5)
POTASSIUM REFLEX MAGNESIUM: 3.6 MMOL/L (ref 3.5–5.1)
PRO-BNP: 275 PG/ML (ref 0–124)
RBC # BLD: 3.79 M/UL (ref 4–5.2)
SODIUM BLD-SCNC: 133 MMOL/L (ref 136–145)
TOTAL CK: 24 U/L (ref 26–192)
TOTAL PROTEIN: 6.9 G/DL (ref 6.4–8.2)
TROPONIN: <0.01 NG/ML
TROPONIN: <0.01 NG/ML
WBC # BLD: 6.9 K/UL (ref 4–11)

## 2019-06-02 PROCEDURE — 6360000002 HC RX W HCPCS: Performed by: PHYSICIAN ASSISTANT

## 2019-06-02 PROCEDURE — 83880 ASSAY OF NATRIURETIC PEPTIDE: CPT

## 2019-06-02 PROCEDURE — 83605 ASSAY OF LACTIC ACID: CPT

## 2019-06-02 PROCEDURE — 73552 X-RAY EXAM OF FEMUR 2/>: CPT

## 2019-06-02 PROCEDURE — 85379 FIBRIN DEGRADATION QUANT: CPT

## 2019-06-02 PROCEDURE — 96374 THER/PROPH/DIAG INJ IV PUSH: CPT

## 2019-06-02 PROCEDURE — 85025 COMPLETE CBC W/AUTO DIFF WBC: CPT

## 2019-06-02 PROCEDURE — 6370000000 HC RX 637 (ALT 250 FOR IP): Performed by: INTERNAL MEDICINE

## 2019-06-02 PROCEDURE — 93005 ELECTROCARDIOGRAM TRACING: CPT | Performed by: PHYSICIAN ASSISTANT

## 2019-06-02 PROCEDURE — 6360000002 HC RX W HCPCS: Performed by: INTERNAL MEDICINE

## 2019-06-02 PROCEDURE — 1200000000 HC SEMI PRIVATE

## 2019-06-02 PROCEDURE — 99285 EMERGENCY DEPT VISIT HI MDM: CPT

## 2019-06-02 PROCEDURE — 71046 X-RAY EXAM CHEST 2 VIEWS: CPT

## 2019-06-02 PROCEDURE — 80053 COMPREHEN METABOLIC PANEL: CPT

## 2019-06-02 PROCEDURE — 93010 ELECTROCARDIOGRAM REPORT: CPT | Performed by: INTERNAL MEDICINE

## 2019-06-02 PROCEDURE — 84484 ASSAY OF TROPONIN QUANT: CPT

## 2019-06-02 PROCEDURE — 96376 TX/PRO/DX INJ SAME DRUG ADON: CPT

## 2019-06-02 PROCEDURE — 87040 BLOOD CULTURE FOR BACTERIA: CPT

## 2019-06-02 PROCEDURE — 71250 CT THORAX DX C-: CPT

## 2019-06-02 PROCEDURE — 82550 ASSAY OF CK (CPK): CPT

## 2019-06-02 RX ORDER — MORPHINE SULFATE 2 MG/ML
4 INJECTION, SOLUTION INTRAMUSCULAR; INTRAVENOUS ONCE
Status: COMPLETED | OUTPATIENT
Start: 2019-06-02 | End: 2019-06-02

## 2019-06-02 RX ORDER — MORPHINE SULFATE 2 MG/ML
2 INJECTION, SOLUTION INTRAMUSCULAR; INTRAVENOUS
Status: DISCONTINUED | OUTPATIENT
Start: 2019-06-02 | End: 2019-06-03 | Stop reason: HOSPADM

## 2019-06-02 RX ORDER — MORPHINE SULFATE 2 MG/ML
4 INJECTION, SOLUTION INTRAMUSCULAR; INTRAVENOUS
Status: DISCONTINUED | OUTPATIENT
Start: 2019-06-02 | End: 2019-06-03 | Stop reason: HOSPADM

## 2019-06-02 RX ORDER — OXYCODONE HCL 10 MG/1
20 TABLET, FILM COATED, EXTENDED RELEASE ORAL EVERY 12 HOURS SCHEDULED
Status: DISCONTINUED | OUTPATIENT
Start: 2019-06-02 | End: 2019-06-03 | Stop reason: HOSPADM

## 2019-06-02 RX ORDER — OXYCODONE HCL 20 MG/1
20 TABLET, FILM COATED, EXTENDED RELEASE ORAL EVERY 12 HOURS
Status: ON HOLD | COMMUNITY
End: 2019-06-06 | Stop reason: HOSPADM

## 2019-06-02 RX ADMIN — MORPHINE SULFATE 4 MG: 2 INJECTION, SOLUTION INTRAMUSCULAR; INTRAVENOUS at 21:06

## 2019-06-02 RX ADMIN — OXYCODONE HYDROCHLORIDE 20 MG: 10 TABLET, FILM COATED, EXTENDED RELEASE ORAL at 23:42

## 2019-06-02 RX ADMIN — MORPHINE SULFATE 4 MG: 2 INJECTION, SOLUTION INTRAMUSCULAR; INTRAVENOUS at 23:42

## 2019-06-02 RX ADMIN — MORPHINE SULFATE 4 MG: 2 INJECTION, SOLUTION INTRAMUSCULAR; INTRAVENOUS at 18:29

## 2019-06-02 ASSESSMENT — ENCOUNTER SYMPTOMS
ABDOMINAL PAIN: 0
SHORTNESS OF BREATH: 0
NAUSEA: 0
EYE PAIN: 0
BACK PAIN: 1
DIARRHEA: 0
COUGH: 0
VOMITING: 0

## 2019-06-02 ASSESSMENT — PAIN SCALES - GENERAL
PAINLEVEL_OUTOF10: 8
PAINLEVEL_OUTOF10: 7
PAINLEVEL_OUTOF10: 8
PAINLEVEL_OUTOF10: 6
PAINLEVEL_OUTOF10: 8

## 2019-06-02 ASSESSMENT — PAIN DESCRIPTION - PAIN TYPE
TYPE: ACUTE PAIN

## 2019-06-02 ASSESSMENT — PAIN DESCRIPTION - DESCRIPTORS
DESCRIPTORS: ACHING
DESCRIPTORS: ACHING;THROBBING
DESCRIPTORS: ACHING

## 2019-06-02 ASSESSMENT — PAIN DESCRIPTION - ONSET
ONSET: PROGRESSIVE
ONSET: PROGRESSIVE

## 2019-06-02 ASSESSMENT — PAIN DESCRIPTION - PROGRESSION
CLINICAL_PROGRESSION: GRADUALLY WORSENING
CLINICAL_PROGRESSION: GRADUALLY IMPROVING
CLINICAL_PROGRESSION: GRADUALLY WORSENING

## 2019-06-02 ASSESSMENT — PAIN DESCRIPTION - LOCATION
LOCATION: LEG
LOCATION: CHEST;GENERALIZED
LOCATION: CHEST;GENERALIZED

## 2019-06-02 ASSESSMENT — PAIN DESCRIPTION - FREQUENCY
FREQUENCY: CONTINUOUS

## 2019-06-02 ASSESSMENT — PAIN - FUNCTIONAL ASSESSMENT
PAIN_FUNCTIONAL_ASSESSMENT: PREVENTS OR INTERFERES SOME ACTIVE ACTIVITIES AND ADLS
PAIN_FUNCTIONAL_ASSESSMENT: ACTIVITIES ARE NOT PREVENTED

## 2019-06-02 ASSESSMENT — PAIN DESCRIPTION - ORIENTATION: ORIENTATION: RIGHT

## 2019-06-02 NOTE — ED PROVIDER NOTES
Negative for cough and shortness of breath. Cardiovascular: Positive for chest pain. Gastrointestinal: Negative for abdominal pain, diarrhea, nausea and vomiting. Genitourinary: Negative for dysuria. Musculoskeletal: Positive for back pain. Negative for neck pain and neck stiffness. Skin: Negative for rash. Neurological: Negative for dizziness and headaches. Psychiatric/Behavioral: Negative for confusion. Positives and Pertinent negatives as per HPI. Except as noted abovein the ROS, all other systems were reviewed and negative. PAST MEDICAL HISTORY     Past Medical History:   Diagnosis Date    [facility] ([facility]     Diabetes mellitus ([facility]     GERD (gastroesophageal reflux disease)     Hypertension          SURGICAL HISTORY     Past Surgical History:   Procedure Laterality Date     SECTION      CHOLECYSTECTOMY      DILATION AND CURETTAGE OF UTERUS      TUMOR REMOVAL      TUNNELED VENOUS PORT PLACEMENT      Port removed          CURRENTMEDICATIONS       Previous Medications    BLOOD GLUCOSE MONITOR STRIPS    Check sugars qac and hs    BLOOD GLUCOSE MONITORING SUPPL KELLY    Check blood sugars BID prior to breakfast and 2 hours after largest meal and prn feeling \"bad\". DM 2 ( E11.9)    BLOOD GLUCOSE TEST STRIPS (ASCENSIA AUTODISC VI;ONE TOUCH ULTRA TEST VI) STRIP    1 each by In Vitro route daily QD testing    CONTINUOUS BLOOD GLUC  (FREESTYLE SHEILA READER) KELLY    Check sugars qac and hs    CONTINUOUS BLOOD GLUC SENSOR (FREESTYLE SHEILA SENSOR SYSTEM) MISC    USE TO CHECK SUGAR BEFORE MEALS AND AT BEDTIME    FENOFIBRATE 160 MG TABLET    TAKE 1 TABLET BY MOUTH EVERY DAY    GABAPENTIN (NEURONTIN) 100 MG CAPSULE    Take 100 mg by mouth 2 times daily.     INSULIN LISPRO (HUMALOG KWIKPEN) 100 UNIT/ML PEN    Inject 30 Units into the skin 3 times daily (before meals)    INSULIN PEN NEEDLE 31G X 5 MM MISC    qac and HS    JARDIANCE 25 MG TABLET    TAKE 1 TABLET BY MOUTH EVERY DAY    LANCETS MISC    Check sugars qac and hs    LANTUS SOLOSTAR 100 UNIT/ML INJECTION PEN    INJECT 1O UNITS SUBCUTANEOUSLY AT BEDTIME    LIDOCAINE-PRILOCAINE (EMLA) 2.5-2.5 % CREAM    Apply topically as needed for Pain Apply a thin layer 30 minutes prior to treatment and cover. METOPROLOL SUCCINATE (TOPROL XL) 100 MG EXTENDED RELEASE TABLET    TAKE 1 TABLET BY MOUTH EVERY DAY    MULTIPLE VITAMINS-MINERALS (MULTIVITAMIN PO)    Take 1 tablet by mouth daily     NABUMETONE (RELAFEN) 500 MG TABLET    TAKE 1 TABLET BY MOUTH TWICE A DAY AS NEEDED    NIRAPARIB TOSYLATE 100 MG CAPS    Take 2 capsules by mouth nightly    OXYCODONE (OXYCONTIN) 20 MG EXTENDED RELEASE TABLET    Take 20 mg by mouth every 12 hours. OXYCODONE HCL (OXY-IR) 10 MG IMMEDIATE RELEASE TABLET    Take 10 mg by mouth every 8 hours as needed for Pain. PREDNISONE (DELTASONE) 10 MG TABLET    Take 1 tablet by mouth daily    SYNTHROID 125 MCG TABLET    Take 1 tablet by mouth every morning (before breakfast)         ALLERGIES     Iv dye [iodides];  Codeine; and Pcn [penicillins]    FAMILYHISTORY       Family History   Problem Relation Age of Onset    Diabetes Mother     Hypertension Mother     Cancer Brother         kidney    Cancer Brother         melanoma          SOCIAL HISTORY       Social History     Socioeconomic History    Marital status:      Spouse name: Not on file    Number of children: Not on file    Years of education: Not on file    Highest education level: Not on file   Occupational History    Occupation: retired   Social Needs    Financial resource strain: Not on file    Food insecurity:     Worry: Not on file     Inability: Not on file   MATIvision needs:     Medical: Not on file     Non-medical: Not on file   Tobacco Use    Smoking status: Never Smoker    Smokeless tobacco: Never Used   Substance and Sexual Activity    Alcohol use: No    Drug use: No    Sexual activity: Not on file   Lifestyle    Physical activity:     Days per week: Not on file     Minutes per session: Not on file    Stress: Not on file   Relationships    Social connections:     Talks on phone: Not on file     Gets together: Not on file     Attends Religion service: Not on file     Active member of club or organization: Not on file     Attends meetings of clubs or organizations: Not on file     Relationship status: Not on file    Intimate partner violence:     Fear of current or ex partner: Not on file     Emotionally abused: Not on file     Physically abused: Not on file     Forced sexual activity: Not on file   Other Topics Concern    Not on file   Social History Narrative    Not on file       SCREENINGS             PHYSICAL EXAM    (up to 7 for level 4, 8 or more for level 5)     ED Triage Vitals [06/02/19 1656]   BP Temp Temp Source Pulse Resp SpO2 Height Weight   (!) 140/75 98.7 °F (37.1 °C) Oral 103 18 99 % -- 151 lb 10.8 oz (68.8 kg)       Physical Exam   Constitutional: She is oriented to person, place, and time. She appears well-developed. No distress. Very thin   HENT:   Head: Normocephalic and atraumatic. Eyes: Right eye exhibits no discharge. Left eye exhibits no discharge. Neck: Normal range of motion. Neck supple. Cardiovascular: Normal rate. Pulmonary/Chest: Effort normal. No stridor. No respiratory distress. She exhibits tenderness (right upper and right upper back). Abdominal: Soft. She exhibits no distension. There is no tenderness. Musculoskeletal: Normal range of motion. She exhibits no edema. Neurological: She is alert and oriented to person, place, and time. No sensory deficit. She exhibits normal muscle tone. No gross facial drooping. Moves all 4 extremities spontaneously. Skin: Skin is warm and dry. She is not diaphoretic. No pallor. Psychiatric: She has a normal mood and affect. Her behavior is normal.   Nursing note and vitals reviewed.       DIAGNOSTIC RESULTS   LABS:    Labs Reviewed   CBC WITH AUTO DIFFERENTIAL - Abnormal; Notable for the following components:       Result Value    RBC 3.79 (*)     Hemoglobin 10.7 (*)     Hematocrit 32.9 (*)     RDW 18.7 (*)     All other components within normal limits    Narrative:     Performed at:  [place] [address]   Phone [phone]   COMPREHENSIVE METABOLIC PANEL W/ REFLEX TO MG FOR LOW K - Abnormal; Notable for the following components:    Sodium 133 (*)     Chloride 93 (*)     Anion Gap 18 (*)     Glucose 341 (*)     BUN 26 (*)     CREATININE 1.3 (*)     GFR Non- 41 (*)     GFR African American 50 (*)     Albumin/Globulin Ratio 1.0 (*)     All other components within normal limits    Narrative:     Performed at:  [address] [facility]   Phone [phone]   BRAIN NATRIURETIC PEPTIDE - Abnormal; Notable for the following components:    Pro- (*)     All other components within normal limits    Narrative:     Performed at:  [place] [address]   Phone [phone]   CK - Abnormal; Notable for the following components:     Total CK 24 (*)     All other components within normal limits    Narrative:     Performed at:  [address] [facility]   Phone [phone]   D-DIMER, QUANTITATIVE - Abnormal; Notable for the following components:    D-Dimer, Quant 661 (*)     All other components within normal limits    Narrative:     Performed at:  [address] [facility]   Phone [phone]   CULTURE BLOOD #1   CULTURE BLOOD #2   TROPONIN    Narrative:     Performed at:  [address] [facility]   Phone [phone]   LACTIC ACID, PLASMA    Narrative: Performed at:  [facility]  [address] [facility] [address]   Phone [phone]   TROPONIN    Narrative:     Performed at:  [facility]  [address] [place] [address]   Phone [phone]   LACTIC ACID, PLASMA       All other labs were within normal range or not returned as of this dictation. EKG: All EKG's are interpreted by the Emergency Department Physician who either signs orCo-signs this chart in the absence of a cardiologist.  Please see their note for interpretation of EKG. RADIOLOGY:   Non-plain film images such as CT, Ultrasound and MRI are read by the radiologist. Plain radiographic images are visualized andpreliminarily interpreted by the  ED Provider with the below findings:        Interpretation perthe Radiologist below, if available at the time of this note:    XR FEMUR RIGHT (MIN 2 VIEWS)   Preliminary Result   No radiographic evidence of acute osseous abnormality. No evidence of   osseous destruction. RECOMMENDATION:   If there is a concern for osseous metastasis, consider nonemergent follow-up   bone scan. CT CHEST WO CONTRAST   Preliminary Result   1. Multiple various sized scattered pulmonary nodules and masses throughout   the bilateral lung parenchyma, consistent with intra pulmonary metastatic   disease. 2. Diffuse intrahepatic metastatic disease. 3. Metastatic mediastinal and right hilar lymphadenopathy. 4. Upper abdominal metastatic lymphadenopathy. 5. Metastatic deposit within the right perirenal space. 6. Multiple scattered subcutaneous soft tissue metastatic deposits within the   upper left chest, left supraclavicular space, right breast, and right lateral   chest wall. 7. Osseous metastatic disease within the thoracic spine, as detailed above,   with associated pathologic fracture of T3 and paraspinal extension.       RECOMMENDATIONS:   Suggest comparison with any prior outside studies to assess for the tempo and   progression of the patient's metastatic disease. Additionally, consider a   follow-up CT abdomen/pelvis with IV and oral contrast for more detailed   evaluation of the patient's abdominal findings. XR CHEST STANDARD (2 VW)   Preliminary Result   Moderate to large right lower and mid lung pulmonary opacity. Findings may   represent atelectasis, pneumonia, and/or malignancy. Recommend correlation   with previous available imaging, given reported history of cancer. Mild vertebral compression deformity of upper thoracic vertebral body appears   chronic. There is sclerosis associated with this vertebral body. Osseous   metastases are not excluded. Correlate with previous available imaging. No evidence of significant pleural effusion. No evidence of pneumothorax. No results found.        PROCEDURES   Unless otherwise noted below, none     Procedures    CRITICAL CARE TIME   N/A    CONSULTS:  None      EMERGENCY DEPARTMENT COURSE and DIFFERENTIALDIAGNOSIS/MDM:   Vitals:    Vitals:    06/02/19 1910 06/02/19 2225 06/02/19 2245 06/02/19 2335   BP: (!) 153/64 (!) 146/67 (!) 151/56 (!) 148/81   Pulse: 87 77 79 77   Resp: 19 16 16 13   Temp:   98.2 °F (36.8 °C)    TempSrc:   Oral    SpO2: 90% 97% 99% 99%   Weight:       Height:           Patient was given thefollowing medications:  Medications   oxyCODONE (OXYCONTIN) extended release tablet 20 mg (has no administration in time range)   morphine (PF) injection 2 mg (has no administration in time range)     Or   morphine (PF) injection 4 mg (has no administration in time range)   morphine (PF) injection 4 mg (4 mg Intravenous Given 6/2/19 1829)   morphine (PF) injection 4 mg (4 mg Intravenous Given 6/2/19 2106)       Differential Diagnosis: Acute bronchitis, Musculoskeletal chest pain, Pleurisy, Pulmonary edema, Congestive Heart Failure, Myocardial Infarction, Pulmonary Embolus, Thoracic Dissection, Pericarditis, Pericardial Effusion, Pneumonia, Pneumothorax, Boerhaave's syndrome, Ischemic Bowel, Bowel Obstruction, PUD, GERD, Acute Cholecystitis, Pancreatitis, Hepatitis, Colitis, other    Patient seen and examined today for chest pain, right upper leg pain. See HPI for patient presentation. Patient is in no acute distress, nontoxic, afebrile with unremarkable vital signs. Lungs are clear and exam.  Very reproducible right upper chest and right upper back tenderness with very light palpation. No calf swelling or tenderness. Pain is in the right anterior thigh. Neurovascularly intact. The patient reports that anaphylactic allergic reaction to IV dye. She was given morphine at reevaluation feels much improved and is comfortable. I have reviewed the patient's laboratory results. The patient has normal WBCs, stable hematocrit and platelets. They have no severe electrolyte abnormality or renal impairment. Creatinine 1.3, baseline per her  labs is 1.13. Her glucose is 341, she has a known history of diabetes. Trop negative. bnp negative. CK negative. Lactic negative. CT chest without contrast performed which reveals diffuse metastatic disease. She has metastatic disease within the thoracic spine as well. Plain films of right femur negative. No acute findings. Discussed results with my attending. We will obtain delta troponin and ddimer. ddimer elevated. Delta trop negative. Patient requiring another dose of morphine for pain control. Discussed plan to admit for pain control, IV heparin overnight and DVT study in the AM. Patient and  agreeable with plan.        Results for orders placed or performed during the hospital encounter of 06/02/19   CBC Auto Differential   Result Value Ref Range    WBC 6.9 4.0 - 11.0 K/uL    RBC 3.79 (L) 4.00 - 5.20 M/uL    Hemoglobin 10.7 (L) 12.0 - 16.0 g/dL    Hematocrit 32.9 (L) 36.0 - 48.0 %    MCV 86.9 80.0 - 100.0 fL    MCH 28.1 26.0 - 34.0 pg    MCHC 32.4 31.0 - 36.0 g/dL    RDW 18.7 (H) 12.4 - 15.4 %    Platelets 559 818 - 479 K/uL    MPV 8.5 5.0 - 10.5 fL    Neutrophils % 71.3 %    Lymphocytes % 18.7 %    Monocytes % 9.0 %    Eosinophils % 0.3 %    Basophils % 0.7 %    Neutrophils # 4.9 1.7 - 7.7 K/uL    Lymphocytes # 1.3 1.0 - 5.1 K/uL    Monocytes # 0.6 0.0 - 1.3 K/uL    Eosinophils # 0.0 0.0 - 0.6 K/uL    Basophils # 0.0 0.0 - 0.2 K/uL   Comprehensive Metabolic Panel w/ Reflex to MG   Result Value Ref Range    Sodium 133 (L) 136 - 145 mmol/L    Potassium reflex Magnesium 3.6 3.5 - 5.1 mmol/L    Chloride 93 (L) 99 - 110 mmol/L    CO2 22 21 - 32 mmol/L    Anion Gap 18 (H) 3 - 16    Glucose 341 (H) 70 - 99 mg/dL    BUN 26 (H) 7 - 20 mg/dL    CREATININE 1.3 (H) 0.6 - 1.2 mg/dL    GFR Non- 41 (A) >60    GFR  50 (A) >60    Calcium 8.9 8.3 - 10.6 mg/dL    Total Protein 6.9 6.4 - 8.2 g/dL    Alb 3.5 3.4 - 5.0 g/dL    Albumin/Globulin Ratio 1.0 (L) 1.1 - 2.2    Total Bilirubin 0.6 0.0 - 1.0 mg/dL    Alkaline Phosphatase 106 40 - 129 U/L    ALT 11 10 - 40 U/L    AST 22 15 - 37 U/L    Globulin 3.4 g/dL   Troponin   Result Value Ref Range    Troponin <0.01 <0.01 ng/mL   Brain Natriuretic Peptide   Result Value Ref Range    Pro- (H) 0 - 124 pg/mL   CK   Result Value Ref Range    Total CK 24 (L) 26 - 192 U/L   Lactic Acid, Plasma   Result Value Ref Range    Lactic Acid 1.4 0.4 - 2.0 mmol/L   Troponin   Result Value Ref Range    Troponin <0.01 <0.01 ng/mL   D-Dimer, Quantitative   Result Value Ref Range    D-Dimer, Quant 661 (H) 0 - 229 ng/mL DDU   EKG 12 Lead   Result Value Ref Range    Ventricular Rate 101 BPM    Atrial Rate 101 BPM    P-R Interval 134 ms    QRS Duration 76 ms    Q-T Interval 356 ms    QTc Calculation (Bazett) 461 ms    P Axis 118 degrees    R Axis 181 degrees    T Axis 159 degrees    Diagnosis       Sinus tachycardiaSuspect limb lead reversalConfirmed by JOSE BAIRD, Kacie Marks ([id number]) on 6/2/2019 9:37:18 PM       I spoke with Dr. Opal Foster. We thoroughly discussed the history, physical exam, laboratory and imaging studies, as well as, emergency department course. Based upon that discussion, we've decided to admit Shiraz Basilio for further observation and evaluation of Rafael Diaz symptoms. As I have deemed necessary from their history, physical, and studies, I have considered and evaluated Shiraz Basilio for the following diagnoses:  ACUTE CORONARY SYNDROME, PERICARDIAL TAMPONADE, PNEUMOTHORAX, PULMONARY EMBOLISM, and THORACIC DISSECTION. FINAL IMPRESSION      1. Right leg pain    2. 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)          DISPOSITION/PLAN   DISPOSITION Admitted 06/02/2019 10:36:13 PM      PATIENT REFERREDTO:  Austin Gonzalez  [address] [facility] [address]  [address]  [address] [place] [address]  [phone]            DISCHARGE MEDICATIONS:  New Prescriptions    No medications on file       DISCONTINUED MEDICATIONS:  Discontinued Medications    MAGNESIUM OXIDE 400 PO    Take by mouth    MILES MIXTURE WITH LIDOCAINE    1-2 tsp s/swallow q4-6h don't eat x 15 min after. ONDANSETRON (ZOFRAN) 8 MG TABLET    Take 1 tablet by mouth every 8 hours as needed for Nausea or Vomiting    POTASSIUM CHLORIDE (KLOR-CON M) 20 MEQ EXTENDED RELEASE TABLET    Take 20 mEq by mouth daily    TIZANIDINE (ZANAFLEX) 2 MG TABLET    Take 1 tablet by mouth every 8 hours as needed (spasm and tightness) Do not mix with oxycodone.               (Please note that portions ofthis note were completed with a voice recognition program.  Efforts were made to edit the dictations but occasionally words are mis-transcribed.)    MEGHA Tran (electronically signed)            MEGHA Tran  06/02/19 6094

## 2019-06-02 NOTE — ED NOTES
Pt presents to the Ed from home with c/o left sided chest pain and BLE weakness since yesterday. Pt a/o x4, answering questions appropriately, denies any n/v/d. 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 No s/s of distress noted.      Mable Hagan RN  06/02/19 1935

## 2019-06-02 NOTE — ED NOTES
Bed: A-17  Expected date:   Expected time:   Means of arrival:   Comments:  [id number] SABA RN  06/02/19 7228

## 2019-06-03 ENCOUNTER — HOSPITAL ENCOUNTER (INPATIENT)
Age: 64
LOS: 3 days | Discharge: HOME | DRG: 543 | End: 2019-06-06
Attending: FAMILY MEDICINE | Admitting: FAMILY MEDICINE
Payer: MEDICARE

## 2019-06-03 ENCOUNTER — APPOINTMENT (OUTPATIENT)
Dept: MRI IMAGING | Age: 64
DRG: 543 | End: 2019-06-03
Payer: MEDICARE

## 2019-06-03 VITALS
HEIGHT: 65 IN | RESPIRATION RATE: 16 BRPM | OXYGEN SATURATION: 94 % | BODY MASS INDEX: 24.32 KG/M2 | DIASTOLIC BLOOD PRESSURE: 69 MMHG | HEART RATE: 75 BPM | SYSTOLIC BLOOD PRESSURE: 176 MMHG | TEMPERATURE: 98 F | WEIGHT: 145.94 LBS

## 2019-06-03 DIAGNOSIS — C49.9 LEIOMYOSARCOMA (HCC): Primary | ICD-10-CM

## 2019-06-03 PROBLEM — G95.20 SPINAL CORD COMPRESSION (HCC): Status: ACTIVE | Noted: 2019-06-03

## 2019-06-03 LAB
A/G RATIO: 0.9 (ref 1.1–2.2)
ALBUMIN SERPL-MCNC: 3.4 G/DL (ref 3.4–5)
ALP BLD-CCNC: 108 U/L (ref 40–129)
ALT SERPL-CCNC: 12 U/L (ref 10–40)
ANION GAP SERPL CALCULATED.3IONS-SCNC: 14 MMOL/L (ref 3–16)
AST SERPL-CCNC: 26 U/L (ref 15–37)
BASOPHILS ABSOLUTE: 0.1 K/UL (ref 0–0.2)
BASOPHILS RELATIVE PERCENT: 0.7 %
BILIRUB SERPL-MCNC: 0.6 MG/DL (ref 0–1)
BUN BLDV-MCNC: 23 MG/DL (ref 7–20)
CALCIUM SERPL-MCNC: 9.5 MG/DL (ref 8.3–10.6)
CHLORIDE BLD-SCNC: 99 MMOL/L (ref 99–110)
CO2: 23 MMOL/L (ref 21–32)
CREAT SERPL-MCNC: 0.9 MG/DL (ref 0.6–1.2)
EOSINOPHILS ABSOLUTE: 0 K/UL (ref 0–0.6)
EOSINOPHILS RELATIVE PERCENT: 0 %
GFR AFRICAN AMERICAN: >60
GFR NON-AFRICAN AMERICAN: >60
GLOBULIN: 3.9 G/DL
GLUCOSE BLD-MCNC: 217 MG/DL (ref 70–99)
GLUCOSE BLD-MCNC: 228 MG/DL (ref 70–99)
GLUCOSE BLD-MCNC: 316 MG/DL (ref 70–99)
GLUCOSE BLD-MCNC: 325 MG/DL (ref 70–99)
HCT VFR BLD CALC: 34.5 % (ref 36–48)
HEMOGLOBIN: 11.1 G/DL (ref 12–16)
LACTIC ACID: 1.1 MMOL/L (ref 0.4–2)
LYMPHOCYTES ABSOLUTE: 1.1 K/UL (ref 1–5.1)
LYMPHOCYTES RELATIVE PERCENT: 12.6 %
MCH RBC QN AUTO: 28 PG (ref 26–34)
MCHC RBC AUTO-ENTMCNC: 32 G/DL (ref 31–36)
MCV RBC AUTO: 87.4 FL (ref 80–100)
MONOCYTES ABSOLUTE: 0.6 K/UL (ref 0–1.3)
MONOCYTES RELATIVE PERCENT: 6.5 %
NEUTROPHILS ABSOLUTE: 6.8 K/UL (ref 1.7–7.7)
NEUTROPHILS RELATIVE PERCENT: 80.2 %
PDW BLD-RTO: 18.9 % (ref 12.4–15.4)
PERFORMED ON: ABNORMAL
PLATELET # BLD: 169 K/UL (ref 135–450)
PMV BLD AUTO: 8.9 FL (ref 5–10.5)
POTASSIUM REFLEX MAGNESIUM: 4.3 MMOL/L (ref 3.5–5.1)
RBC # BLD: 3.95 M/UL (ref 4–5.2)
SODIUM BLD-SCNC: 136 MMOL/L (ref 136–145)
TOTAL PROTEIN: 7.3 G/DL (ref 6.4–8.2)
WBC # BLD: 8.5 K/UL (ref 4–11)

## 2019-06-03 PROCEDURE — 80053 COMPREHEN METABOLIC PANEL: CPT

## 2019-06-03 PROCEDURE — 85025 COMPLETE CBC W/AUTO DIFF WBC: CPT

## 2019-06-03 PROCEDURE — 72148 MRI LUMBAR SPINE W/O DYE: CPT

## 2019-06-03 PROCEDURE — 72146 MRI CHEST SPINE W/O DYE: CPT

## 2019-06-03 PROCEDURE — 6360000002 HC RX W HCPCS: Performed by: INTERNAL MEDICINE

## 2019-06-03 PROCEDURE — 2580000003 HC RX 258: Performed by: INTERNAL MEDICINE

## 2019-06-03 PROCEDURE — 6370000000 HC RX 637 (ALT 250 FOR IP): Performed by: INTERNAL MEDICINE

## 2019-06-03 PROCEDURE — 93971 EXTREMITY STUDY: CPT

## 2019-06-03 PROCEDURE — 83605 ASSAY OF LACTIC ACID: CPT

## 2019-06-03 PROCEDURE — 36415 COLL VENOUS BLD VENIPUNCTURE: CPT

## 2019-06-03 PROCEDURE — 1200000000 HC SEMI PRIVATE

## 2019-06-03 PROCEDURE — 94760 N-INVAS EAR/PLS OXIMETRY 1: CPT

## 2019-06-03 RX ORDER — ACETAMINOPHEN 325 MG/1
650 TABLET ORAL EVERY 4 HOURS PRN
Status: DISCONTINUED | OUTPATIENT
Start: 2019-06-03 | End: 2019-06-03 | Stop reason: HOSPADM

## 2019-06-03 RX ORDER — DEXTROSE MONOHYDRATE 50 MG/ML
100 INJECTION, SOLUTION INTRAVENOUS PRN
Status: DISCONTINUED | OUTPATIENT
Start: 2019-06-03 | End: 2019-06-03 | Stop reason: HOSPADM

## 2019-06-03 RX ORDER — ONDANSETRON 2 MG/ML
4 INJECTION INTRAMUSCULAR; INTRAVENOUS EVERY 6 HOURS PRN
Status: DISCONTINUED | OUTPATIENT
Start: 2019-06-03 | End: 2019-06-03 | Stop reason: HOSPADM

## 2019-06-03 RX ORDER — SODIUM CHLORIDE 0.9 % (FLUSH) 0.9 %
10 SYRINGE (ML) INJECTION PRN
Status: DISCONTINUED | OUTPATIENT
Start: 2019-06-03 | End: 2019-06-03 | Stop reason: HOSPADM

## 2019-06-03 RX ORDER — GABAPENTIN 100 MG/1
100 CAPSULE ORAL 2 TIMES DAILY
Status: DISCONTINUED | OUTPATIENT
Start: 2019-06-03 | End: 2019-06-03 | Stop reason: HOSPADM

## 2019-06-03 RX ORDER — INSULIN GLARGINE 100 [IU]/ML
10 INJECTION, SOLUTION SUBCUTANEOUS EVERY MORNING
Status: DISCONTINUED | OUTPATIENT
Start: 2019-06-03 | End: 2019-06-03 | Stop reason: HOSPADM

## 2019-06-03 RX ORDER — LEVOTHYROXINE SODIUM 0.12 MG/1
125 TABLET ORAL
Status: DISCONTINUED | OUTPATIENT
Start: 2019-06-03 | End: 2019-06-03 | Stop reason: HOSPADM

## 2019-06-03 RX ORDER — AZITHROMYCIN 250 MG/1
250 TABLET, FILM COATED ORAL NIGHTLY
Status: DISCONTINUED | OUTPATIENT
Start: 2019-06-03 | End: 2019-06-03 | Stop reason: HOSPADM

## 2019-06-03 RX ORDER — DEXAMETHASONE SODIUM PHOSPHATE 10 MG/ML
10 INJECTION INTRAMUSCULAR; INTRAVENOUS EVERY 6 HOURS
Status: DISCONTINUED | OUTPATIENT
Start: 2019-06-03 | End: 2019-06-03 | Stop reason: HOSPADM

## 2019-06-03 RX ORDER — DEXTROSE MONOHYDRATE 25 G/50ML
12.5 INJECTION, SOLUTION INTRAVENOUS PRN
Status: DISCONTINUED | OUTPATIENT
Start: 2019-06-03 | End: 2019-06-03 | Stop reason: HOSPADM

## 2019-06-03 RX ORDER — SODIUM CHLORIDE 0.9 % (FLUSH) 0.9 %
10 SYRINGE (ML) INJECTION EVERY 12 HOURS SCHEDULED
Status: DISCONTINUED | OUTPATIENT
Start: 2019-06-03 | End: 2019-06-03 | Stop reason: HOSPADM

## 2019-06-03 RX ORDER — PREDNISONE 10 MG/1
10 TABLET ORAL DAILY
Status: DISCONTINUED | OUTPATIENT
Start: 2019-06-03 | End: 2019-06-03

## 2019-06-03 RX ORDER — NICOTINE POLACRILEX 4 MG
15 LOZENGE BUCCAL PRN
Status: DISCONTINUED | OUTPATIENT
Start: 2019-06-03 | End: 2019-06-03 | Stop reason: HOSPADM

## 2019-06-03 RX ORDER — METOPROLOL SUCCINATE 50 MG/1
100 TABLET, EXTENDED RELEASE ORAL DAILY
Status: DISCONTINUED | OUTPATIENT
Start: 2019-06-03 | End: 2019-06-03 | Stop reason: HOSPADM

## 2019-06-03 RX ADMIN — Medication 10 ML: at 20:29

## 2019-06-03 RX ADMIN — METOPROLOL SUCCINATE 100 MG: 50 TABLET, EXTENDED RELEASE ORAL at 08:21

## 2019-06-03 RX ADMIN — DEXAMETHASONE SODIUM PHOSPHATE 10 MG: 10 INJECTION INTRAMUSCULAR; INTRAVENOUS at 17:29

## 2019-06-03 RX ADMIN — INSULIN LISPRO 10 UNITS: 100 INJECTION, SOLUTION INTRAVENOUS; SUBCUTANEOUS at 17:29

## 2019-06-03 RX ADMIN — INSULIN LISPRO 3 UNITS: 100 INJECTION, SOLUTION INTRAVENOUS; SUBCUTANEOUS at 22:31

## 2019-06-03 RX ADMIN — MORPHINE SULFATE 2 MG: 2 INJECTION, SOLUTION INTRAMUSCULAR; INTRAVENOUS at 22:36

## 2019-06-03 RX ADMIN — INSULIN LISPRO 12 UNITS: 100 INJECTION, SOLUTION INTRAVENOUS; SUBCUTANEOUS at 13:30

## 2019-06-03 RX ADMIN — GABAPENTIN 100 MG: 100 CAPSULE ORAL at 08:21

## 2019-06-03 RX ADMIN — INSULIN LISPRO 12 UNITS: 100 INJECTION, SOLUTION INTRAVENOUS; SUBCUTANEOUS at 17:29

## 2019-06-03 RX ADMIN — AZITHROMYCIN 250 MG: 250 TABLET, FILM COATED ORAL at 02:17

## 2019-06-03 RX ADMIN — GABAPENTIN 100 MG: 100 CAPSULE ORAL at 20:28

## 2019-06-03 RX ADMIN — ENOXAPARIN SODIUM 40 MG: 40 INJECTION SUBCUTANEOUS at 08:21

## 2019-06-03 RX ADMIN — INSULIN GLARGINE 10 UNITS: 100 INJECTION, SOLUTION SUBCUTANEOUS at 13:30

## 2019-06-03 RX ADMIN — MORPHINE SULFATE 2 MG: 2 INJECTION, SOLUTION INTRAMUSCULAR; INTRAVENOUS at 04:28

## 2019-06-03 RX ADMIN — DEXAMETHASONE SODIUM PHOSPHATE 10 MG: 10 INJECTION INTRAMUSCULAR; INTRAVENOUS at 13:29

## 2019-06-03 RX ADMIN — PREDNISONE 10 MG: 10 TABLET ORAL at 08:21

## 2019-06-03 RX ADMIN — MORPHINE SULFATE 2 MG: 2 INJECTION, SOLUTION INTRAMUSCULAR; INTRAVENOUS at 18:20

## 2019-06-03 RX ADMIN — OXYCODONE HYDROCHLORIDE 20 MG: 10 TABLET, FILM COATED, EXTENDED RELEASE ORAL at 08:22

## 2019-06-03 RX ADMIN — INSULIN LISPRO 10 UNITS: 100 INJECTION, SOLUTION INTRAVENOUS; SUBCUTANEOUS at 13:30

## 2019-06-03 RX ADMIN — Medication 10 ML: at 08:22

## 2019-06-03 RX ADMIN — GABAPENTIN 100 MG: 100 CAPSULE ORAL at 02:17

## 2019-06-03 RX ADMIN — MORPHINE SULFATE 2 MG: 2 INJECTION, SOLUTION INTRAMUSCULAR; INTRAVENOUS at 15:25

## 2019-06-03 RX ADMIN — AZITHROMYCIN 250 MG: 250 TABLET, FILM COATED ORAL at 20:28

## 2019-06-03 RX ADMIN — OXYCODONE HYDROCHLORIDE 20 MG: 10 TABLET, FILM COATED, EXTENDED RELEASE ORAL at 20:29

## 2019-06-03 RX ADMIN — LEVOTHYROXINE SODIUM 125 MCG: 125 TABLET ORAL at 08:21

## 2019-06-03 ASSESSMENT — PAIN DESCRIPTION - ORIENTATION
ORIENTATION: RIGHT

## 2019-06-03 ASSESSMENT — PAIN DESCRIPTION - DESCRIPTORS
DESCRIPTORS: ACHING;THROBBING
DESCRIPTORS: ACHING;SORE
DESCRIPTORS: ACHING

## 2019-06-03 ASSESSMENT — PAIN DESCRIPTION - PAIN TYPE
TYPE: ACUTE PAIN

## 2019-06-03 ASSESSMENT — PAIN DESCRIPTION - FREQUENCY
FREQUENCY: CONTINUOUS

## 2019-06-03 ASSESSMENT — PAIN SCALES - GENERAL
PAINLEVEL_OUTOF10: 6
PAINLEVEL_OUTOF10: 8
PAINLEVEL_OUTOF10: 6
PAINLEVEL_OUTOF10: 6
PAINLEVEL_OUTOF10: 3
PAINLEVEL_OUTOF10: 2
PAINLEVEL_OUTOF10: 3
PAINLEVEL_OUTOF10: 3
PAINLEVEL_OUTOF10: 5
PAINLEVEL_OUTOF10: 7
PAINLEVEL_OUTOF10: 5
PAINLEVEL_OUTOF10: 3

## 2019-06-03 ASSESSMENT — PAIN DESCRIPTION - PROGRESSION
CLINICAL_PROGRESSION: NOT CHANGED
CLINICAL_PROGRESSION: GRADUALLY IMPROVING

## 2019-06-03 ASSESSMENT — PAIN DESCRIPTION - LOCATION
LOCATION: LEG
LOCATION: CHEST;LEG

## 2019-06-03 ASSESSMENT — PAIN DESCRIPTION - ONSET
ONSET: ON-GOING

## 2019-06-03 ASSESSMENT — PAIN - FUNCTIONAL ASSESSMENT: PAIN_FUNCTIONAL_ASSESSMENT: PREVENTS OR INTERFERES SOME ACTIVE ACTIVITIES AND ADLS

## 2019-06-03 NOTE — CARE COORDINATION
INITIAL CASE MANAGEMENT ASSESSMENT    Reviewed chart, met with patient to assess possible discharge needs. Explained Case Management role/services. Living Situation: Patient lives with her  in a house with 1 BUCK. ADLs: Was independent prior to admission     DME: None    PT/OT Recs: not assessed     Active Services: none athome. Patient is in a clinical trial at [facility]. Transportation: Drives/ will transport     Medications: [facility]/affordable    PCP: Nando Moss      HD/PD: N/A    PLAN/COMMENTS: Patient will be transferring to another facility. SW/CM provided contact information for patient or family to call with any questions. SW/CM will follow and assist as needed. Electronically signed by Marisela Lynch RN on 6/3/2019 at 4:23 PM

## 2019-06-03 NOTE — CONSULTS
[facility]    Consult Note      Requesting Physician: kylah  CHIEF COMPLAINT: pain       HISTORY OF PRESENT ILLNESS:      Ms. Mary Lou Thompson  is a 75 y.o. female we are seeing in consultation for known stage iv leiomyosarcoma  SHe was diagnosed a number of years ago and she had surgery and adjuvant adriamycin/ifos regimen under the care of dr henson  She felt a lump in her chest wall about 2-3 years ago with biopsy proven mets at that time  She had many subq mets at dx as well as liver mets  THe patient has been through many options for treatment  She had adriamycin/lartruvo   She progressed on this  She went on an immunotherapy trial at [facility] -which did not cause ultimate long term response  She had yondelis which failed   She had taxotere gemzar that failed  She was sent to  for a clinical trial with a parp inhibitor  She is currnetly on this  All awhile she had sub q mets/liver mets and some bone mets  She had palliative xrt to her shoulder    MRI here shows some bone mets along with some canal stenosis -which may be disc related along with epirdural extension at t3  Her left leg is heavy and has to use her arm to lift her leg     [facility] had not been ready to consider palliative care -  PT tells me she is being transferred to [facility] for nsurg vs rad onc intervention       Past Medical History:        Diagnosis Date    Arthritis     Cancer [facility]     Diabetes mellitus ([facility]     GERD (gastroesophageal reflux disease)     Hyperlipidemia     Hypertension      Past Surgical History:        Procedure Laterality Date     SECTION      CHOLECYSTECTOMY      DILATION AND CURETTAGE OF UTERUS      TUMOR REMOVAL      TUNNELED VENOUS PORT PLACEMENT      Port removed        Current Medications:    Current Facility-Administered Medications: gabapentin (NEURONTIN) capsule 100 mg, 100 mg, Oral, BID  metoprolol succinate (TOPROL XL) extended release tablet 100 mg, 100 mg, Oral, Daily  levothyroxine (SYNTHROID) tablet 125 mcg, 125 mcg, Oral, QAM AC  sodium chloride flush 0.9 % injection 10 mL, 10 mL, Intravenous, 2 times per day  sodium chloride flush 0.9 % injection 10 mL, 10 mL, Intravenous, PRN  magnesium hydroxide (MILK OF MAGNESIA) 400 MG/5ML suspension 30 mL, 30 mL, Oral, Daily PRN  ondansetron (ZOFRAN) injection 4 mg, 4 mg, Intravenous, Q6H PRN  enoxaparin (LOVENOX) injection 40 mg, 40 mg, Subcutaneous, Daily  acetaminophen (TYLENOL) tablet 650 mg, 650 mg, Oral, Q4H PRN  azithromycin (ZITHROMAX) tablet 250 mg, 250 mg, Oral, Nightly  Niraparib Tosylate CAPS 200 mg, 200 mg, Oral, Nightly  insulin glargine (LANTUS) injection vial 10 Units, 10 Units, Subcutaneous, QAM  glucose (GLUTOSE) 40 % oral gel 15 g, 15 g, Oral, PRN  dextrose 50 % solution 12.5 g, 12.5 g, Intravenous, PRN  glucagon (rDNA) injection 1 mg, 1 mg, Intramuscular, PRN  dextrose 5 % solution, 100 mL/hr, Intravenous, PRN  dexamethasone (DECADRON) injection 10 mg, 10 mg, Intravenous, Q6H  insulin lispro (HUMALOG) injection vial 10 Units, 10 Units, Subcutaneous, TID WC  insulin lispro (HUMALOG) injection vial 0-18 Units, 0-18 Units, Subcutaneous, TID WC  insulin lispro (HUMALOG) injection vial 0-9 Units, 0-9 Units, Subcutaneous, Nightly  oxyCODONE (OXYCONTIN) extended release tablet 20 mg, 20 mg, Oral, 2 times per day  morphine (PF) injection 2 mg, 2 mg, Intravenous, Q2H PRN **OR** morphine (PF) injection 4 mg, 4 mg, Intravenous, Q2H PRN  Allergies: Iv dye [iodides];  Codeine; and Pcn [penicillins]    Social History:      Social History     Socioeconomic History    Marital status:      Spouse name: Not on file    Number of children: Not on file    Years of education: Not on file    Highest education level: Not on file   Occupational History    Occupation: retired   Social Needs    Financial resource strain: Not on file    Food insecurity:     Worry: Not on file     Inability: Not on file   [facility] Transportation needs:     Medical: Not on file     Non-medical: Not on file   Tobacco Use    Smoking status: Never Smoker    Smokeless tobacco: Never Used   Substance and Sexual Activity    Alcohol use: No    Drug use: No    Sexual activity: Not on file   Lifestyle    Physical activity:     Days per week: Not on file     Minutes per session: Not on file    Stress: Not on file   Relationships    Social connections:     Talks on phone: Not on file     Gets together: Not on file     Attends Congregation service: Not on file     Active member of club or organization: Not on file     Attends meetings of clubs or organizations: Not on file     Relationship status: Not on file    Intimate partner violence:     Fear of current or ex partner: Not on file     Emotionally abused: Not on file     Physically abused: Not on file     Forced sexual activity: Not on file   Other Topics Concern    Not on file   Social History Narrative    Not on file          Family History:         Problem Relation Age of Onset    Diabetes Mother     Hypertension Mother     Cancer Brother         kidney    Cancer Brother         melanoma     REVIEW OF SYSTEMS:    ROS per the HPI   Otherwise  10 point ROS negative     PHYSICAL EXAM:      Vitals:  BP (!) 176/69   Pulse 75   Temp 98 °F (36.7 °C) (Oral)   Resp 16   Ht 5' 5\" (1.651 m)   Wt 145 lb 15.1 oz (66.2 kg)   SpO2 94%   BMI 24.29 kg/m²     CONSTITUTIONAL:  awake, alert, cooperative, no apparent distress, and appears stated age NAD  EYES:  pupils equal, round and reactive to light, extra ocular muscles intact, sclera clear, conjunctiva normal  NECK:  Supple, symmetrical, trachea midline, no adenopathy, thyroid symmetric, not enlarged and no tenderness, skin normal  HEMATOLOGIC/LYMPHATICS:  no cervical lymphadenopathy, no supraclavicular lymphadenopathy,   LUNGS:  No increased work of breathing, good air exchange, clear to auscultation bilaterally, no crackles or venous catheter terminates over the cavoatrial junction. There is a moderate-sized pulmonary opacity within the right mid to lower lung. No significant pleural effusion. No evidence of pneumothorax. Mild compression deformity of the upper thoracic vertebral body appears chronic with mild sclerosis. 1. Moderate to large right lower and mid lung pulmonary opacity. Findings may represent atelectasis, pneumonia, and/or malignancy. Recommend correlation with previous available imaging, given reported history of cancer. 2. Mild vertebral compression deformity of upper thoracic vertebral body appears chronic. There is sclerosis associated with this vertebral body. Osseous metastases are not excluded. Correlate with previous available imaging. 3. No evidence of significant pleural effusion. No evidence of pneumothorax. Xr Femur Right (min 2 Views)    Result Date: 6/3/2019  EXAMINATION: 2 XRAY VIEWS OF THE RIGHT FEMUR 6/2/2019 9:41 pm COMPARISON: None. HISTORY: ORDERING SYSTEM PROVIDED HISTORY: pain, h/o leiomyosarcoma TECHNOLOGIST PROVIDED HISTORY: Reason for exam:->pain, h/o leiomyosarcoma Ordering Physician Provided Reason for Exam: pain, h/o leiomyosarcoma, pain midshaft femur, no injury Acuity: Acute Type of Exam: Initial FINDINGS: Diffuse osteopenia limits evaluation for nondisplaced fractures. Degenerative changes of right hip and right knee. No evidence of acute osseous abnormalities. No osseous destruction identified. No radiographic evidence of acute osseous abnormality. No evidence of osseous destruction. RECOMMENDATION: If there is a concern for osseous metastasis, consider nonemergent follow-up bone scan. Ct Chest Wo Contrast    Result Date: 6/2/2019  EXAMINATION: CT OF THE CHEST WITHOUT CONTRAST 6/2/2019 6:17 pm TECHNIQUE: CT of the chest was performed without the administration of intravenous contrast. Multiplanar reformatted images are provided for review.  Dose modulation, iterative reconstruction, and/or weight based adjustment of the mA/kV was utilized to reduce the radiation dose to as low as reasonably achievable. COMPARISON: No prior chest CT for comparison. Comparison made to recent chest x-ray dated 06/02/2019 at 1713 hours HISTORY: ORDERING SYSTEM PROVIDED HISTORY: right sided chest/back pain, cough last week, patient has leiomyosarcoma. treated for bronchitis last week. TECHNOLOGIST PROVIDED HISTORY: Ordering Physician Provided Reason for Exam: right sided chest/back pain, cough last week, patient has leiomyosarcoma. treated for bronchitis last week. Acuity: Acute Type of Exam: Initial Patient with history of leiomyosarcoma. Now presents with multiple nodular densities on recent chest x-ray. FINDINGS: Mediastinum: The visualized thyroid is unremarkable. There is a mildly enlarged 2.2 x 1.9 cm right paratracheal lymph node, concerning for metastatic disease. There is also an enlarged right hilar lymph node noted measuring approximately 2.1 x 2.0 cm, concerning for metastatic disease. No additional pathologic mediastinal or hilar lymphadenopathy is identified. There is atherosclerotic disease of the thoracic aorta, without aneurysm. No pericardial abnormality is identified. Lungs/pleura: The central airways are patent. There are multiple scattered various sized pulmonary masses and pulmonary nodules throughout both lungs, the largest measuring approximately 6.2 x 5.7 cm along the right minor fissure within both the anterior right upper lobe as well as the right middle lobe. These multifocal nodules/masses are highly concerning for underlying intra pulmonary metastatic disease. There is mild subpleural opacity within the anterior left upper lobe, which could represent either atelectasis or parenchymal scar. There is minimal dependent atelectasis within the bilateral lower lobes. No additional focus of airspace consolidation is identified.   There is no evidence of a pneumothorax or pleural effusion. Upper Abdomen: There are multiple scattered various sized heterogeneous though primarily low-attenuation mass lesions scattered throughout the liver parenchyma consistent with intrahepatic metastatic disease. The largest of these measures approximately 11.3 x 6.2 cm within the inferior right hepatic lobe. The patient is status post cholecystectomy. There is a 1.2 cm metastatic deposit within the right perirenal space. There are multiple nicola metastases within the upper abdomen, the largest being a partially visualized necrotic mass measuring approximately 6.5 x 5.4 cm within the left upper quadrant. Visualized portions of the adrenal glands are unremarkable. Soft Tissues/Bones: There is an approximate 1.9 x 1.9 cm metastatic deposit within the subcutaneous soft tissues of the upper left chest just anterior to the pectoral muscle. There is also a 3.7 x 3.7 cm heterogeneous and partially necrotic metastatic nodule within the left supraclavicular space. There is an approximate 1.2 cm metastatic deposit within the right breast soft tissues. There is a 1.8 cm metastatic deposit within the soft tissues of the right lateral chest wall. There is no osteolytic lesion within the T3 vertebral body with associated pathologic fracture cortical breakthrough and extension into the paraspinal soft tissues. There is a mixed osteolytic and osteoblastic lesion within T9 without definite pathologic fracture. No additional osteolytic or osteoblastic lesion is identified. 1. Multiple various sized scattered pulmonary nodules and masses throughout the bilateral lung parenchyma, consistent with intra pulmonary metastatic disease. 2. Diffuse intrahepatic metastatic disease. 3. Metastatic mediastinal and right hilar lymphadenopathy. 4. Upper abdominal metastatic lymphadenopathy. 5. Metastatic deposit within the right perirenal space.  6. Multiple scattered subcutaneous soft tissue metastatic deposits within the upper left chest, left supraclavicular space, right breast, and right lateral chest wall. 7. Osseous metastatic disease within the thoracic spine, as detailed above, with associated pathologic fracture of T3 and paraspinal extension. RECOMMENDATIONS: Suggest comparison with any prior outside studies to assess for the tempo and progression of the patient's metastatic disease. Additionally, consider a follow-up CT abdomen/pelvis with IV and oral contrast for more detailed evaluation of the patient's abdominal findings. Mri Thoracic Spine Wo Contrast    Result Date: 6/3/2019  EXAMINATION: MRI OF THE LUMBAR SPINE WITHOUT CONTRAST; MRI OF THE THORACIC SPINE WITHOUT CONTRAST, 6/3/2019 12:12 pm TECHNIQUE: Multiplanar multisequence MRI of the lumbar spine was performed without the administration of intravenous contrast.; Multiplanar multisequence MRI of the thoracic spine was performed without the administration of intravenous contrast. COMPARISON: CT chest 06/03/2019. HISTORY: ORDERING SYSTEM PROVIDED HISTORY: spinal cord compression from cancer suspected, right leg weakness TECHNOLOGIST PROVIDED HISTORY: Ordering Physician Provided Reason for Exam: 75 y.o. female with PMH of HTN, DM, metastatic leiomyosarcoma who presented to [facility] for chest, back and leg pain all on the right side. She was treated with pain medication, cardiac work up and PE work up neg. Acuity: Acute Type of Exam: Initial FINDINGS: MRI thoracic: There is diffuse abnormal bone marrow signal intensity with decreased T1 signal intensity and heterogeneous increased T2 signal intensity within the T3 and T9 vertebral bodies. There is extraosseous extension of disease into the prevertebral space at T3 as well as into the ventral epidural space at T3. There is mild spinal canal stenosis at the T3 level. There is a soft tissue mass within the right paraspinal musculature centered at the T4-5 level.   This mass measures 2.0 x 3.7 x 4.9 cm in AP by transverse by craniocaudal dimension. There is severe spinal canal stenosis at T8-9 secondary to a focal 4 mm left paracentral disc protrusion. This flattens the ventral surface of the spinal cord. There is mild abnormal increased T2 signal intensity within the thoracic spinal cord at this level. There is severe spinal canal stenosis at T11-12 secondary to a focal 4 mm left paracentral disc protrusion. Subtle increased T2 signal intensity is noted within the thoracic spinal cord at this level. There is moderate spinal canal stenosis at T5-6 secondary to a focal 3 mm central disc protrusion. Diffuse hepatic metastatic disease is noted, incompletely imaged. MRI lumbar: There is normal alignment of the lumbar spine. There is no acute fracture or listhesis. There is a 1 cm focus of bone marrow signal abnormality within the S2 segment consistent with a focus of osseous metastatic disease. Intervertebral disc heights are maintained. There is an intramuscular metastatic lesion within the right psoas muscle measuring 1.1 x 1.7 cm. L1-L2: There is no disc bulge or protrusion present. There is no significant spinal canal stenosis or neural foraminal narrowing present. L2-L3: There is no disc bulge or protrusion present. There is no significant spinal canal stenosis or neural foraminal narrowing present. L3-L4: There is a disc bulge, facet arthropathy and thickening of the ligamentum flavum resulting in mild spinal canal stenosis. There is no significant neural foraminal narrowing. L4-L5: There is a disc bulge and facet arthropathy. There is moderate spinal canal stenosis. No significant neural foraminal narrowing is present. L5-S1: There is a disc bulge and facet arthropathy. There is moderate left neural foraminal narrowing. No spinal canal stenosis or right neural foraminal narrowing is present.      1. Osseous metastatic disease at T3, T9 and S2. 2. Severe spinal canal stenosis at T8-9 and T11-12 secondary to extraosseous extension of disease into the prevertebral space at T3 as well as into the ventral epidural space at T3. There is mild spinal canal stenosis at the T3 level. There is a soft tissue mass within the right paraspinal musculature centered at the T4-5 level. This mass measures 2.0 x 3.7 x 4.9 cm in AP by transverse by craniocaudal dimension. There is severe spinal canal stenosis at T8-9 secondary to a focal 4 mm left paracentral disc protrusion. This flattens the ventral surface of the spinal cord. There is mild abnormal increased T2 signal intensity within the thoracic spinal cord at this level. There is severe spinal canal stenosis at T11-12 secondary to a focal 4 mm left paracentral disc protrusion. Subtle increased T2 signal intensity is noted within the thoracic spinal cord at this level. There is moderate spinal canal stenosis at T5-6 secondary to a focal 3 mm central disc protrusion. Diffuse hepatic metastatic disease is noted, incompletely imaged. MRI lumbar: There is normal alignment of the lumbar spine. There is no acute fracture or listhesis. There is a 1 cm focus of bone marrow signal abnormality within the S2 segment consistent with a focus of osseous metastatic disease. Intervertebral disc heights are maintained. There is an intramuscular metastatic lesion within the right psoas muscle measuring 1.1 x 1.7 cm. L1-L2: There is no disc bulge or protrusion present. There is no significant spinal canal stenosis or neural foraminal narrowing present. L2-L3: There is no disc bulge or protrusion present. There is no significant spinal canal stenosis or neural foraminal narrowing present. L3-L4: There is a disc bulge, facet arthropathy and thickening of the ligamentum flavum resulting in mild spinal canal stenosis. There is no significant neural foraminal narrowing. L4-L5: There is a disc bulge and facet arthropathy. There is moderate spinal canal stenosis.   No significant neural foraminal narrowing is present. L5-S1: There is a disc bulge and facet arthropathy. There is moderate left neural foraminal narrowing. No spinal canal stenosis or right neural foraminal narrowing is present. 1. Osseous metastatic disease at T3, T9 and S2. 2. Severe spinal canal stenosis at T8-9 and T11-12 secondary to 4 mm left paracentral disc protrusions at both of these levels. There is mild increased T2 signal intensity within the thoracic spinal cord suggesting compressive edema at these levels. 3. Extraosseous extension of tumor at T3 extending into the prevertebral space and ventral epidural space. There is resulting mild spinal canal stenosis. 4. Intramuscular metastatic disease within the right paraspinal musculature at T4-5 as well as the right psoas muscle. 5. Redemonstration of diffuse hepatic metastatic disease, incompletely imaged. 6. Multilevel degenerative changes of the lumbar spine most pronounced at L4-5 with a disc bulge and facet arthropathy results in moderate spinal canal stenosis. The findings were sent to the Radiology Results [address] at 1:42 pm on 6/3/2019to be communicated to a licensed caregiver.      Vl Extremity Venous Right    Result Date: 6/3/2019  Vascular Lower Extremities DVT Study Procedure -- PRELIMINARY SONOGRAPHER REPORT --   Demographics   Patient Name      Ian Given   Date of Study     06/03/2019          Gender              Female   Patient Number    [id number]          Date of Birth       1955   Visit Number      [id number]           Age                 75 year(s)   Accession Number  [id number]           Room Number         4130   Corporate ID      [id number]            Dexter Sanon RVT                                                            CCT   Ordering          Herbert Coombs MD  Interpreting        Blake Rosario,  Physician                             Physician           MD  Procedure Type of Study:   Celeste Fam DVT Study, VL EXTREMITY VENOUS DUPLEX RIGHT. Tech Comments Right No evidence of deep vein or superficial vein thrombosis involving the right lower extremity and the left common femoral vein. Incidental finding on the right: posterior (proximal) calf, echogenic vascularized mass measuring 6.1 cm by 4.1 cm.         Problem List  Patient Active Problem List   Diagnosis    Leiomyosarcoma ([address]    Encounter for consultation    Secondary malignant neoplasm of muscle of hip ([address]    Secondary malignant neoplasm of liver ([address]    Chemotherapy induced nausea and vomiting    Chemotherapy induced diarrhea    Chemotherapy induced neutropenia ([address]    Type 2 diabetes mellitus with renal complication (HCC)    Hypothyroidism, adult    Bilateral shoulder pain    Fatty liver    Essential hypertension    Hyperlipidemia LDL goal <70    Carpal tunnel syndrome, bilateral    Acute right flank pain    Hordeolum externum of left upper eyelid    Acute bilateral thoracic back pain    Chest pain    Spinal cord compression (HCC)       IMPRESSION/RECOMMENDATIONS:  Recurrent stage iv leiomyosarcoma  With new onset left leg weakness  Worsening pain  She had an mri showing concerns at t3 t9 and s2  She has disc protrusions at t8/9   She has extraooseous extension of tumor at t3 into the prevertebral and ventral epidural space     Her leg weakness is new to me-I have not seen her in a few mont due to her going on trial at [facility]     She has known leiomyosarcoma with mult tx (I have no standard chemos left to offer her outside of single agent ifosfamide which would likely be difficult on her marrow as her gem taxotere was troublesome )     Now on phase 1 trial at    I had seen her and referred her for this  She is being transferred to [facility] for nsurg eval and will alert radiation oncology   Based on her mri she has some canal stenosis and tumor extension  She is on high dose steroids  [facility] eval =I suspect a decision on radiation vs surgery will be made which most casandra will be radiation-?  Is it t3 causing the issue   Pt was awatiing transfer when I saw her tonight around 6 pm

## 2019-06-03 NOTE — PROGRESS NOTES
4 Eyes Skin Assessment     The patient is being assess for  Admission    I agree that 2 RN's have performed a thorough Head to Toe Skin Assessment on the patient. ALL assessment sites listed below have been assessed. Areas assessed by both nurses:   [x]   Head, Face, and Ears   [x]   Shoulders, Back, and Chest  [x]   Arms, Elbows, and Hands   [x]   Coccyx, Sacrum, and IschIum  [x]   Legs, Feet, and Heels        Does the Patient have Skin Breakdown?   No         Jose Prevention initiated:  No   Wound Care Orders initiated:  No      [facility] nurse consulted for Pressure Injury (Stage 3,4, Unstageable, DTI, NWPT, and Complex wounds), New and Established Ostomies:  No      Nurse 1 eSignature: Electronically signed by Sarah Mendez RN on 6/3/19 at 6:28 AM    **SHARE this note so that the co-signing nurse is able to place an eSignature**    Nurse 2 eSignature: Electronically signed by Tory Najera RN on 6/3/2019 at 6:36 AM

## 2019-06-03 NOTE — H&P
Hospital Medicine History & Physical      PCP: Nando Moss    Date of Admission: 2019    Date of Service: Pt seen/examined on 2019 and Admitted to Inpatient with expected LOS greater than two midnights due to medical therapy. Chief Complaint:  Chest pain      History Of Present Illness:    75 y.o. female who presented to [facility] [place] AT [place] with 1 day history of increasing pain in her chest.  Patient also reports crampy pain in the right leg. She admits to shortness of breath and cough. Patient patient unfortunately has extensive metastatic leiomyosarcoma and is currently in a clinical trial at [facility] --issue and is now followed by [facility] hematology oncology  Patient is on chronic pain medication for her cancer pain. Just recently was completing Zithromax for upper respiratory tract infection. Past Medical History:          Diagnosis Date    [facility] ([facility] [address]     Diabetes mellitus ([facility] [address]     GERD (gastroesophageal reflux disease)     Hypertension        Past Surgical History:          Procedure Laterality Date     SECTION      CHOLECYSTECTOMY      DILATION AND CURETTAGE OF UTERUS      TUMOR REMOVAL      TUNNELED VENOUS PORT PLACEMENT      Port removed        Medications Prior to Admission:      Prior to Admission medications    Medication Sig Start Date End Date Taking? Authorizing Provider   oxyCODONE (OXYCONTIN) 20 MG extended release tablet Take 20 mg by mouth every 12 hours. Yes Historical Provider, MD   Niraparib Tosylate 100 MG CAPS Take 2 capsules by mouth nightly   Yes Historical Provider, MD   fenofibrate 160 MG tablet TAKE 1 TABLET BY MOUTH EVERY DAY 19  Yes Lesley Gunn DO   JARDIANCE 25 MG tablet TAKE 1 TABLET BY MOUTH EVERY DAY 19  Yes Lesley Gunn DO   gabapentin (NEURONTIN) 100 MG capsule Take 100 mg by mouth 2 times daily.    Yes Historical Provider, MD   oxyCODONE HCl (OXY-IR) 10 MG immediate release tablet Take 10 mg by mouth every 8 hours as needed for Pain. Yes Historical Provider, MD   nabumetone (RELAFEN) 500 MG tablet TAKE 1 TABLET BY MOUTH TWICE A DAY AS NEEDED 12/11/18  Yes Natasha Hillman DO   SYNTHROID 125 MCG tablet Take 1 tablet by mouth every morning (before breakfast) 12/3/18  Yes Natasha Hillman, DO   LANTUS SOLOSTAR 100 UNIT/ML injection pen INJECT 1O UNITS SUBCUTANEOUSLY AT BEDTIME 9/14/18  Yes Natasha Hillman, DO   insulin lispro (HUMALOG KWIKPEN) 100 UNIT/ML pen Inject 30 Units into the skin 3 times daily (before meals)  Patient taking differently: Inject into the skin 3 times daily (before meals) Per sliding scale 6/8/18  Yes Natasha Hillman, DO   metoprolol succinate (TOPROL XL) 100 MG extended release tablet TAKE 1 TABLET BY MOUTH EVERY DAY 6/6/18  Yes Natasha Hillman, DO   predniSONE (DELTASONE) 10 MG tablet Take 1 tablet by mouth daily 5/22/18  Yes Natasha Hillman, DO   Multiple Vitamins-Minerals (MULTIVITAMIN PO) Take 1 tablet by mouth daily    Yes Historical Provider, MD   Continuous Blood Gluc Sensor (FREESTYLE SHEILA SENSOR SYSTEM) MISC USE TO CHECK SUGAR BEFORE MEALS AND AT BEDTIME 5/28/19   Natasha Langford, DO   blood glucose test strips (ASCENSIA AUTODISC VI;ONE TOUCH ULTRA TEST VI) strip 1 each by In Vitro route daily QD testing 12/3/18   Joaquindon Sachs, DO   blood glucose monitor strips Check sugars qac and hs 9/19/18   [facility], DO   Continuous Blood Gluc  (FREESTYLE SHEILA READER) KELLY Check sugars qac and hs 9/14/18   [facility], DO   Insulin Pen Needle 31G X 5 MM MISC qac and HS 6/8/18   [facility], DO   Lancets MISC Check sugars qac and hs 6/8/18   [facility], DO   Blood Glucose Monitoring Suppl KELLY Check blood sugars BID prior to breakfast and 2 hours after largest meal and prn feeling \"bad\".   DM 2 ( E11.9) 3/3/18   Natasha Hillman, DO   lidocaine-prilocaine (EMLA) 2.5-2.5 % cream Apply topically as needed for Pain Apply a thin layer 30 minutes prior to treatment and cover. 5/3/17   Madhu Buck MD       Allergies: Iv dye [iodides]; Codeine; and Pcn [penicillins]    Social History:      The patient currently lives with family  TOBACCO:   reports that she has never smoked. She has never used smokeless tobacco.  ETOH:   reports that she does not drink alcohol. Family History:       Reviewed in detail and negative for DM, CAD, Cancer, CVA. Positive as follows:        Problem Relation Age of Onset    Diabetes Mother     Hypertension Mother     Cancer Brother         kidney    Cancer Brother         melanoma       REVIEW OF SYSTEMS:   Pertinent positives as noted in the HPI. All other systems reviewed and negative. PHYSICAL EXAM PERFORMED:    BP (!) 156/79   Pulse 78   Temp 98.2 °F (36.8 °C) (Oral)   Resp 17   Ht 5' 4\" (1.626 m)   Wt 151 lb 10.8 oz (68.8 kg)   SpO2 99%   BMI 26.04 kg/m²     General appearance:  No apparent distress, appears stated age and cooperative. HEENT:  Normal cephalic, atraumatic without obvious deformity. Pupils equal, round, and reactive to light. Extra ocular muscles intact. Conjunctivae/corneas clear. Neck: Supple, with full range of motion. No jugular venous distention. Trachea midline. Respiratory:  Normal respiratory effort. Clear to auscultation, bilaterally without Rales/Wheezes/Rhonchi. Cardiovascular:  Regular rate and rhythm with normal S1/S2 without murmurs, rubs or gallops. Abdomen: Soft, non-tender, non-distended with normal bowel sounds. Musculoskeletal:  No clubbing, cyanosis or edema bilaterally. Full range of motion without deformity. Skin: Skin color, texture, turgor normal.  No rashes or lesions. Neurologic:  Neurovascularly intact without any focal sensory/motor deficits.  Cranial nerves: II-XII intact, grossly non-focal.  Psychiatric:  Alert and oriented, thought content appropriate, normal insight  Capillary Refill: Brisk,< 3 seconds   Peripheral Pulses: +2 palpable, equal bilaterally       Labs:     Recent Labs     06/02/19 1817   WBC 6.9   HGB 10.7*   HCT 32.9*        Recent Labs     06/02/19 1817   *   K 3.6   CL 93*   CO2 22   BUN 26*   CREATININE 1.3*   CALCIUM 8.9     Recent Labs     06/02/19 1817   AST 22   ALT 11   BILITOT 0.6   ALKPHOS 106     No results for input(s): INR in the last 72 hours. Recent Labs     06/02/19 1817 06/02/19 2050   CKTOTAL 24*  --    TROPONINI <0.01 <0.01       Urinalysis:      Lab Results   Component Value Date    BLOODU negative 06/22/2018    SPECGRAV 1.010 06/22/2018    GLUCOSEU >2,000 06/22/2018       Radiology:         XR FEMUR RIGHT (MIN 2 VIEWS)   Preliminary Result   No radiographic evidence of acute osseous abnormality. No evidence of   osseous destruction. RECOMMENDATION:   If there is a concern for osseous metastasis, consider nonemergent follow-up   bone scan. CT CHEST WO CONTRAST   Preliminary Result   1. Multiple various sized scattered pulmonary nodules and masses throughout   the bilateral lung parenchyma, consistent with intra pulmonary metastatic   disease. 2. Diffuse intrahepatic metastatic disease. 3. Metastatic mediastinal and right hilar lymphadenopathy. 4. Upper abdominal metastatic lymphadenopathy. 5. Metastatic deposit within the right perirenal space. 6. Multiple scattered subcutaneous soft tissue metastatic deposits within the   upper left chest, left supraclavicular space, right breast, and right lateral   chest wall. 7. Osseous metastatic disease within the thoracic spine, as detailed above,   with associated pathologic fracture of T3 and paraspinal extension. RECOMMENDATIONS:   Suggest comparison with any prior outside studies to assess for the tempo and   progression of the patient's metastatic disease. Additionally, consider a   follow-up CT abdomen/pelvis with IV and oral contrast for more detailed   evaluation of the patient's abdominal findings.          XR CHEST STANDARD (2 VW)   Preliminary Result   Moderate to large right lower and mid lung pulmonary opacity. Findings may   represent atelectasis, pneumonia, and/or malignancy. Recommend correlation   with previous available imaging, given reported history of cancer. Mild vertebral compression deformity of upper thoracic vertebral body appears   chronic. There is sclerosis associated with this vertebral body. Osseous   metastases are not excluded. Correlate with previous available imaging. No evidence of significant pleural effusion. No evidence of pneumothorax. ASSESSMENT/PLAN:    Chest pain-likely all from metastatic disease. Some concern for PE. Pt has contrast allergy  Will check dopplers in am  Continue morphine iv for severe cancer pain  May need oxycontin bumped up at dc    R thigh pain-likely from the cancer  Will check doppler to rule out dvt    URI-was completing zithromax. Will give her dose here    Dm type 2-continue lantus and novolog    Hypothyroidism-continue synthroid            DVT Prophylaxis: lovenox  Diet: No diet orders on file  Code Status: No Order        Dispo - 2-3 d     Zaina Ariza MD    Thank you Osmin Rodríguez for the opportunity to be involved in this patient's care. If you have any questions or concerns please feel free to contact me at [phone].

## 2019-06-03 NOTE — PROGRESS NOTES
Internal Medicine PGY-3 Resident Progress Note        PCP: Jackie Posada    Date of Admission: 6/2/2019    Chief Complaint: chest pain, leg weakness    Hospital Course: 75 y.o. female with PMH of HTN, DM, metastatic leiomyosarcoma who presented to [facility] for chest, back and leg pain all on the right side. She was treated with pain medication, cardiac work up and PE work up neg. Subjective: Pain better, has right leg weakness, can not lift right leg. Right chest pain is reproducible upon palpation. Medications:  Reviewed    Infusion Medications    dextrose       Scheduled Medications    gabapentin  100 mg Oral BID    metoprolol succinate  100 mg Oral Daily    levothyroxine  125 mcg Oral QAM AC    sodium chloride flush  10 mL Intravenous 2 times per day    enoxaparin  40 mg Subcutaneous Daily    azithromycin  250 mg Oral Nightly    Niraparib Tosylate  200 mg Oral Nightly    insulin glargine  10 Units Subcutaneous QAM    insulin lispro  0-6 Units Subcutaneous TID WC    insulin lispro  0-3 Units Subcutaneous Nightly    dexamethasone  10 mg Intravenous Q6H    insulin lispro  10 Units Subcutaneous TID WC    oxyCODONE  20 mg Oral 2 times per day     PRN Meds: sodium chloride flush, magnesium hydroxide, ondansetron, acetaminophen, glucose, dextrose, glucagon (rDNA), dextrose, morphine **OR** morphine    No intake or output data in the 24 hours ending 06/03/19 1150    Physical Exam Performed:    BP (!) 153/83   Pulse 77   Temp 97.8 °F (36.6 °C) (Oral)   Resp 18   Ht 5' 5\" (1.651 m)   Wt 145 lb 15.1 oz (66.2 kg)   SpO2 95%   BMI 24.29 kg/m²     General appearance: No apparent distress, appears stated age and cooperative. HEENT: Pupils equal, round, and reactive to light. Conjunctivae/corneas clear. Neck: Supple, with full range of motion. No jugular venous distention. Trachea midline. Respiratory:  Normal respiratory effort.  Clear to auscultation, bilaterally without Upper abdominal metastatic lymphadenopathy. 5. Metastatic deposit within the right perirenal space. 6. Multiple scattered subcutaneous soft tissue metastatic deposits within the   upper left chest, left supraclavicular space, right breast, and right lateral   chest wall. 7. Osseous metastatic disease within the thoracic spine, as detailed above,   with associated pathologic fracture of T3 and paraspinal extension. RECOMMENDATIONS:   Suggest comparison with any prior outside studies to assess for the tempo and   progression of the patient's metastatic disease. Additionally, consider a   follow-up CT abdomen/pelvis with IV and oral contrast for more detailed   evaluation of the patient's abdominal findings. XR CHEST STANDARD (2 VW)   Preliminary Result   Moderate to large right lower and mid lung pulmonary opacity. Findings may   represent atelectasis, pneumonia, and/or malignancy. Recommend correlation   with previous available imaging, given reported history of cancer. Mild vertebral compression deformity of upper thoracic vertebral body appears   chronic. There is sclerosis associated with this vertebral body. Osseous   metastases are not excluded. Correlate with previous available imaging. No evidence of significant pleural effusion. No evidence of pneumothorax. MRI LUMBAR SPINE WO CONTRAST    (Results Pending)   MRI THORACIC SPINE WO CONTRAST    (Results Pending)           Assessment/Plan:    Luisnora Grewal, 75 y.o. female w/ PMH of HTN, DM, metastatic leiomyosarcoma who presented to [facility] for chest, back and leg pain all on the right side.   Active Hospital Problems    Diagnosis Date Noted    Chest pain [R07.9] 06/02/2019     Right leg weakness concerning for Cord compression from metastatic leiomyosarcoma  CT showed thoracic osteo metastasis  - MRI lumbar and thoracic spine  - empirical treatment with Decadron 10 mg Q6H  - may need to consult rad onc, neurosurgery or transfer to  since she is a patient on clinical trial over there. Right Chest and back pain  - reproducible  - likely due to tumor  - will continue pain management    URI  - completing zithromx    DMII  - uncontrolled  - will put patient on Lantus/Lispro  - titrate up as needed  - hypoglycemia protocol with sliding scale. Hypothyroidism-continue synthroid        DVT Prophylaxis: Lovenox  Diet: DIET LOW SODIUM 2 GM;  Dietary Nutrition Supplements: Standard High Calorie Oral Supplement  Code Status: Full Code    PT/OT Eval Status: none    Dispo - wards    Coni Brock DO    I will discuss the patient with the senior resident and MD Coni Lao DO. Internal Medicine Resident PGY-3  Pager: [phone]        Addendum to Resident H&P/Progress note:  Pt seen,examined and evaluated. I have reviewed the current history, physical findings, labs and assessment and plan and agree with note as documented by resident MD    Chest pain, back pain and   RLE motor deficit - suspect due to extensive metastatic disease. MRI T and L spine - multilevel spinal stenosis with ?cord compression. Cont pain control. Ask neurosurgery to eval.   Ask HemOnc to eval ( pt now follows at [facility] for clinical trials, but used to follow with Dr Bonnee Goldmann)  ? if she may be a candidate for rad onc eval and treatment. Overall prognosis grim.           Thiago Santiago

## 2019-06-04 ENCOUNTER — APPOINTMENT (OUTPATIENT)
Dept: MRI IMAGING | Age: 64
DRG: 543 | End: 2019-06-04
Attending: FAMILY MEDICINE
Payer: MEDICARE

## 2019-06-04 LAB
GLUCOSE BLD-MCNC: 171 MG/DL (ref 70–99)
GLUCOSE BLD-MCNC: 190 MG/DL (ref 70–99)
GLUCOSE BLD-MCNC: 211 MG/DL (ref 70–99)
GLUCOSE BLD-MCNC: 238 MG/DL (ref 70–99)
GLUCOSE BLD-MCNC: 368 MG/DL (ref 70–99)
PERFORMED ON: ABNORMAL

## 2019-06-04 PROCEDURE — 77290 THER RAD SIMULAJ FIELD CPLX: CPT

## 2019-06-04 PROCEDURE — C9113 INJ PANTOPRAZOLE SODIUM, VIA: HCPCS | Performed by: NURSE PRACTITIONER

## 2019-06-04 PROCEDURE — 6360000004 HC RX CONTRAST MEDICATION: Performed by: INTERNAL MEDICINE

## 2019-06-04 PROCEDURE — 6360000002 HC RX W HCPCS: Performed by: NURSE PRACTITIONER

## 2019-06-04 PROCEDURE — 2580000003 HC RX 258: Performed by: FAMILY MEDICINE

## 2019-06-04 PROCEDURE — 6370000000 HC RX 637 (ALT 250 FOR IP): Performed by: FAMILY MEDICINE

## 2019-06-04 PROCEDURE — 1200000000 HC SEMI PRIVATE

## 2019-06-04 PROCEDURE — 72197 MRI PELVIS W/O & W/DYE: CPT

## 2019-06-04 PROCEDURE — 6370000000 HC RX 637 (ALT 250 FOR IP): Performed by: INTERNAL MEDICINE

## 2019-06-04 PROCEDURE — 6360000002 HC RX W HCPCS: Performed by: FAMILY MEDICINE

## 2019-06-04 PROCEDURE — A9576 INJ PROHANCE MULTIPACK: HCPCS | Performed by: INTERNAL MEDICINE

## 2019-06-04 PROCEDURE — 77333 RADIATION TREATMENT AID(S): CPT

## 2019-06-04 RX ORDER — LEVOTHYROXINE SODIUM 0.12 MG/1
125 TABLET ORAL
Status: DISCONTINUED | OUTPATIENT
Start: 2019-06-04 | End: 2019-06-06 | Stop reason: HOSPADM

## 2019-06-04 RX ORDER — OXYCODONE HCL 20 MG/1
20 TABLET, FILM COATED, EXTENDED RELEASE ORAL EVERY 12 HOURS
Status: DISCONTINUED | OUTPATIENT
Start: 2019-06-04 | End: 2019-06-05

## 2019-06-04 RX ORDER — LISINOPRIL 10 MG/1
10 TABLET ORAL DAILY
Status: DISCONTINUED | OUTPATIENT
Start: 2019-06-04 | End: 2019-06-06 | Stop reason: HOSPADM

## 2019-06-04 RX ORDER — ACETAMINOPHEN 325 MG/1
650 TABLET ORAL EVERY 4 HOURS PRN
Status: DISCONTINUED | OUTPATIENT
Start: 2019-06-04 | End: 2019-06-06 | Stop reason: HOSPADM

## 2019-06-04 RX ORDER — SODIUM CHLORIDE 0.9 % (FLUSH) 0.9 %
10 SYRINGE (ML) INJECTION EVERY 12 HOURS SCHEDULED
Status: DISCONTINUED | OUTPATIENT
Start: 2019-06-04 | End: 2019-06-06 | Stop reason: HOSPADM

## 2019-06-04 RX ORDER — DEXAMETHASONE SODIUM PHOSPHATE 4 MG/ML
4 INJECTION, SOLUTION INTRA-ARTICULAR; INTRALESIONAL; INTRAMUSCULAR; INTRAVENOUS; SOFT TISSUE EVERY 6 HOURS
Status: DISCONTINUED | OUTPATIENT
Start: 2019-06-04 | End: 2019-06-04

## 2019-06-04 RX ORDER — SODIUM CHLORIDE 0.9 % (FLUSH) 0.9 %
10 SYRINGE (ML) INJECTION PRN
Status: DISCONTINUED | OUTPATIENT
Start: 2019-06-04 | End: 2019-06-06 | Stop reason: HOSPADM

## 2019-06-04 RX ORDER — OXYCODONE HYDROCHLORIDE 5 MG/1
10 TABLET ORAL EVERY 6 HOURS PRN
Status: DISCONTINUED | OUTPATIENT
Start: 2019-06-04 | End: 2019-06-06 | Stop reason: HOSPADM

## 2019-06-04 RX ORDER — DEXTROSE MONOHYDRATE 25 G/50ML
12.5 INJECTION, SOLUTION INTRAVENOUS PRN
Status: DISCONTINUED | OUTPATIENT
Start: 2019-06-04 | End: 2019-06-06 | Stop reason: HOSPADM

## 2019-06-04 RX ORDER — NICOTINE POLACRILEX 4 MG
15 LOZENGE BUCCAL PRN
Status: DISCONTINUED | OUTPATIENT
Start: 2019-06-04 | End: 2019-06-06 | Stop reason: HOSPADM

## 2019-06-04 RX ORDER — FENOFIBRATE 160 MG/1
160 TABLET ORAL DAILY
Status: DISCONTINUED | OUTPATIENT
Start: 2019-06-04 | End: 2019-06-06 | Stop reason: HOSPADM

## 2019-06-04 RX ORDER — PANTOPRAZOLE SODIUM 40 MG/10ML
40 INJECTION, POWDER, LYOPHILIZED, FOR SOLUTION INTRAVENOUS DAILY
Status: DISCONTINUED | OUTPATIENT
Start: 2019-06-04 | End: 2019-06-06 | Stop reason: ALTCHOICE

## 2019-06-04 RX ORDER — ONDANSETRON 2 MG/ML
4 INJECTION INTRAMUSCULAR; INTRAVENOUS EVERY 6 HOURS PRN
Status: DISCONTINUED | OUTPATIENT
Start: 2019-06-04 | End: 2019-06-06 | Stop reason: HOSPADM

## 2019-06-04 RX ORDER — METOPROLOL SUCCINATE 100 MG/1
100 TABLET, EXTENDED RELEASE ORAL DAILY
Status: DISCONTINUED | OUTPATIENT
Start: 2019-06-04 | End: 2019-06-06 | Stop reason: HOSPADM

## 2019-06-04 RX ORDER — PREDNISONE 10 MG/1
10 TABLET ORAL DAILY
Status: DISCONTINUED | OUTPATIENT
Start: 2019-06-04 | End: 2019-06-05

## 2019-06-04 RX ORDER — GABAPENTIN 100 MG/1
100 CAPSULE ORAL 2 TIMES DAILY
Status: DISCONTINUED | OUTPATIENT
Start: 2019-06-04 | End: 2019-06-06 | Stop reason: HOSPADM

## 2019-06-04 RX ORDER — DEXTROSE MONOHYDRATE 50 MG/ML
100 INJECTION, SOLUTION INTRAVENOUS PRN
Status: DISCONTINUED | OUTPATIENT
Start: 2019-06-04 | End: 2019-06-06 | Stop reason: HOSPADM

## 2019-06-04 RX ORDER — HYDRALAZINE HYDROCHLORIDE 20 MG/ML
10 INJECTION INTRAMUSCULAR; INTRAVENOUS EVERY 6 HOURS PRN
Status: DISCONTINUED | OUTPATIENT
Start: 2019-06-04 | End: 2019-06-06 | Stop reason: HOSPADM

## 2019-06-04 RX ORDER — MORPHINE SULFATE 2 MG/ML
2 INJECTION, SOLUTION INTRAMUSCULAR; INTRAVENOUS EVERY 4 HOURS PRN
Status: DISPENSED | OUTPATIENT
Start: 2019-06-04 | End: 2019-06-06

## 2019-06-04 RX ADMIN — OXYCODONE HYDROCHLORIDE 10 MG: 5 TABLET ORAL at 04:10

## 2019-06-04 RX ADMIN — OXYCODONE HYDROCHLORIDE 10 MG: 5 TABLET ORAL at 14:55

## 2019-06-04 RX ADMIN — MORPHINE SULFATE 2 MG: 2 INJECTION, SOLUTION INTRAMUSCULAR; INTRAVENOUS at 08:13

## 2019-06-04 RX ADMIN — INSULIN LISPRO 10 UNITS: 100 INJECTION, SOLUTION INTRAVENOUS; SUBCUTANEOUS at 12:12

## 2019-06-04 RX ADMIN — INSULIN LISPRO 2 UNITS: 100 INJECTION, SOLUTION INTRAVENOUS; SUBCUTANEOUS at 20:55

## 2019-06-04 RX ADMIN — ENOXAPARIN SODIUM 40 MG: 40 INJECTION SUBCUTANEOUS at 08:14

## 2019-06-04 RX ADMIN — OXYCODONE HYDROCHLORIDE 20 MG: 20 TABLET, FILM COATED, EXTENDED RELEASE ORAL at 12:03

## 2019-06-04 RX ADMIN — GABAPENTIN 100 MG: 100 CAPSULE ORAL at 20:53

## 2019-06-04 RX ADMIN — LISINOPRIL 10 MG: 10 TABLET ORAL at 12:12

## 2019-06-04 RX ADMIN — PREDNISONE 10 MG: 10 TABLET ORAL at 08:14

## 2019-06-04 RX ADMIN — GADOTERIDOL 14 ML: 279.3 INJECTION, SOLUTION INTRAVENOUS at 19:01

## 2019-06-04 RX ADMIN — MORPHINE SULFATE 2 MG: 2 INJECTION, SOLUTION INTRAMUSCULAR; INTRAVENOUS at 17:52

## 2019-06-04 RX ADMIN — LEVOTHYROXINE SODIUM 125 MCG: 0.12 TABLET ORAL at 06:13

## 2019-06-04 RX ADMIN — METOPROLOL SUCCINATE 100 MG: 100 TABLET, EXTENDED RELEASE ORAL at 08:13

## 2019-06-04 RX ADMIN — INSULIN LISPRO 2 UNITS: 100 INJECTION, SOLUTION INTRAVENOUS; SUBCUTANEOUS at 08:35

## 2019-06-04 RX ADMIN — FENOFIBRATE 160 MG: 160 TABLET ORAL at 08:13

## 2019-06-04 RX ADMIN — Medication 10 ML: at 08:14

## 2019-06-04 RX ADMIN — GABAPENTIN 100 MG: 100 CAPSULE ORAL at 08:13

## 2019-06-04 RX ADMIN — OXYCODONE HYDROCHLORIDE 10 MG: 5 TABLET ORAL at 20:54

## 2019-06-04 RX ADMIN — INSULIN GLARGINE 15 UNITS: 100 INJECTION, SOLUTION SUBCUTANEOUS at 20:55

## 2019-06-04 RX ADMIN — Medication 10 ML: at 20:01

## 2019-06-04 RX ADMIN — PANTOPRAZOLE SODIUM 40 MG: 40 INJECTION, POWDER, FOR SOLUTION INTRAVENOUS at 12:12

## 2019-06-04 RX ADMIN — INSULIN LISPRO 15 UNITS: 100 INJECTION, SOLUTION INTRAVENOUS; SUBCUTANEOUS at 12:13

## 2019-06-04 ASSESSMENT — PAIN DESCRIPTION - ONSET
ONSET: ON-GOING

## 2019-06-04 ASSESSMENT — PAIN SCALES - GENERAL
PAINLEVEL_OUTOF10: 4
PAINLEVEL_OUTOF10: 7
PAINLEVEL_OUTOF10: 3
PAINLEVEL_OUTOF10: 6
PAINLEVEL_OUTOF10: 4
PAINLEVEL_OUTOF10: 6
PAINLEVEL_OUTOF10: 5
PAINLEVEL_OUTOF10: 3
PAINLEVEL_OUTOF10: 0
PAINLEVEL_OUTOF10: 4
PAINLEVEL_OUTOF10: 9
PAINLEVEL_OUTOF10: 6

## 2019-06-04 ASSESSMENT — PAIN DESCRIPTION - FREQUENCY
FREQUENCY: CONTINUOUS

## 2019-06-04 ASSESSMENT — PAIN DESCRIPTION - PROGRESSION
CLINICAL_PROGRESSION: NOT CHANGED

## 2019-06-04 ASSESSMENT — PAIN DESCRIPTION - PAIN TYPE
TYPE: ACUTE PAIN

## 2019-06-04 ASSESSMENT — PAIN DESCRIPTION - ORIENTATION
ORIENTATION: RIGHT
ORIENTATION: LOWER
ORIENTATION: RIGHT

## 2019-06-04 ASSESSMENT — PAIN DESCRIPTION - LOCATION
LOCATION: LEG;HIP
LOCATION: BACK
LOCATION: LEG
LOCATION: LEG;HIP
LOCATION: LEG

## 2019-06-04 ASSESSMENT — PAIN DESCRIPTION - DESCRIPTORS
DESCRIPTORS: ACHING

## 2019-06-04 NOTE — CONSULTS
NEUROSURGERY Leah Dang  [id number]   1955   2019    Requesting physician: Rossy Almonte MD    Reason for consultation: Right leg weakness and pain    History of present illness: Patient is a 75 y.o. female w/ PMH of leiomyosarcoma who presented on 2019 with right leg weakness and pain. Patient endorses pain starting in right hip going down the anterior aspect of the right leg to foot. Patient also reports weakness to the leg, in the thigh more so than the calf and foot. Patient states the pain and weakness started Saturday and has been getting progressively worse. Patient reports a palpable tumor present to the right hip/pelvic area. Patient reports the area is painful. Patient denies numbness or tingling to the BLE. Patient denies pain or weakness to LLE.     ROS:   GENERAL:  Denies fever or recent illness.  Denies weight changes   EYES:  Denies vision change or diplopia  EARS:  Denies hearing loss  CARDIAC:  Denies chest pain  RESPIRATORY:  Denies shortness of breath  SKIN:  Denies rash or lesions   HEM:  Denies excessive bruising  PSYCH:  Denies anxiety or depression  NEURO:  Denies headache, numbness or tingling or lateralizing weakness   :  Denies urinary difficulty  GI: Denies nausea, vomiting, diarrhea or constipation  MUSCULOSKELETAL:  No arthralgias    Allergies   Allergen Reactions    Iv Dye [Iodides] Other (See Comments)     Heartburn, chest pain, shakes, vertigo    Codeine     Pcn [Penicillins]        Past Medical History:   Diagnosis Date    Arthritis     Cancer ([address]     Diabetes mellitus ([address]     GERD (gastroesophageal reflux disease)     Hyperlipidemia     Hypertension         Past Surgical History:   Procedure Laterality Date     SECTION      CHOLECYSTECTOMY      DILATION AND CURETTAGE OF UTERUS      TUMOR REMOVAL      TUNNELED VENOUS PORT PLACEMENT      Port removed        Social History     Occupational History    Occupation: retired Tobacco Use    Smoking status: Never Smoker    Smokeless tobacco: Never Used   Substance and Sexual Activity    Alcohol use: No    Drug use: No    Sexual activity: Not on file        Family History   Problem Relation Age of Onset    Diabetes Mother     Hypertension Mother     Cancer Brother         kidney    Cancer Brother         melanoma        Outpatient Medications Marked as Taking for the 6/3/19 encounter [facility] Encounter)   Medication Sig Dispense Refill    oxyCODONE (OXYCONTIN) 20 MG extended release tablet Take 20 mg by mouth every 12 hours.  Niraparib Tosylate 100 MG CAPS Take 2 capsules by mouth nightly      JARDIANCE 25 MG tablet TAKE 1 TABLET BY MOUTH EVERY DAY 30 tablet 4    gabapentin (NEURONTIN) 100 MG capsule Take 100 mg by mouth 2 times daily.       SYNTHROID 125 MCG tablet Take 1 tablet by mouth every morning (before breakfast) 90 tablet 1    LANTUS SOLOSTAR 100 UNIT/ML injection pen INJECT 1O UNITS SUBCUTANEOUSLY AT BEDTIME 15 pen 5    insulin lispro (HUMALOG KWIKPEN) 100 UNIT/ML pen Inject 30 Units into the skin 3 times daily (before meals) (Patient taking differently: Inject into the skin 3 times daily (before meals) Per sliding scale) 5 pen 3    metoprolol succinate (TOPROL XL) 100 MG extended release tablet TAKE 1 TABLET BY MOUTH EVERY DAY 90 tablet 3      Current Facility-Administered Medications   Medication Dose Route Frequency Provider Last Rate Last Dose    fenofibrate tablet 160 mg  160 mg Oral Daily Katherin Herrmann MD   160 mg at 06/04/19 0813    gabapentin (NEURONTIN) capsule 100 mg  100 mg Oral BID Katherin Herrmann MD   100 mg at 06/04/19 0813    empagliflozin (JARDIANCE) tablet TABS 25 mg  25 mg Oral Daily Katherin Herrmann MD   25 mg at 06/04/19 3966    metoprolol succinate (TOPROL XL) extended release tablet 100 mg  100 mg Oral Daily Katherin Herrmann MD   100 mg at 06/04/19 0813    Niraparib Tosylate CAPS 2 capsule  2 capsule Oral Nightly Katherin Herrmann MD  oxyCODONE (OXYCONTIN) extended release tablet 20 mg  20 mg Oral Q12H Kvgn Tracey MD        oxyCODONE (ROXICODONE) immediate release tablet 10 mg  10 mg Oral Q6H PRN Kvgn Tracey MD   10 mg at 06/04/19 0410    predniSONE (DELTASONE) tablet 10 mg  10 mg Oral Daily Kvng Tracey MD   10 mg at 06/04/19 3790    levothyroxine (SYNTHROID) tablet 125 mcg  125 mcg Oral QAM AC Kvng Tracey MD   125 mcg at 06/04/19 4403    insulin lispro (HUMALOG) injection pen 0-6 Units  0-6 Units Subcutaneous TID WC Kvng Tarcey MD   2 Units at 06/04/19 0835    insulin lispro (HUMALOG) injection pen 0-3 Units  0-3 Units Subcutaneous Nightly Kvng Tracey MD        morphine (PF) injection 2 mg  2 mg Intravenous Q4H PRN Kvng Tracey MD   2 mg at 06/04/19 0813    ondansetron (ZOFRAN) injection 4 mg  4 mg Intravenous Q6H PRN Kvng Tracey MD        sodium chloride flush 0.9 % injection 10 mL  10 mL Intravenous 2 times per day Kvng Tracey MD   10 mL at 06/04/19 0814    sodium chloride flush 0.9 % injection 10 mL  10 mL Intravenous PRN Kvng Tracey MD        acetaminophen (TYLENOL) tablet 650 mg  650 mg Oral Q4H PRN Kvng Tracey MD        enoxaparin (LOVENOX) injection 40 mg  40 mg Subcutaneous Daily Kvng Tracey MD   40 mg at 06/04/19 0814    glucose (GLUTOSE) 40 % oral gel 15 g  15 g Oral PRN Kvng Tracey MD        dextrose 50 % IV solution  12.5 g Intravenous PRN Kvng Tracey MD        glucagon (rDNA) injection 1 mg  1 mg Intramuscular PRN Kvng Tracey MD        dextrose 5 % solution  100 mL/hr Intravenous PRN Kvng Tracey MD        insulin glargine (LANTUS) injection pen 8 Units  8 Units Subcutaneous Nightly Kvng Tracey MD          Objective:  BP (!) 191/89   Pulse 85   Temp 98 °F (36.7 °C) (Oral)   Resp 16   Ht 5' 5\" (1.651 m)   Wt 147 lb 4.3 oz (66.8 kg)   SpO2 98%   BMI 24.51 kg/m²     Physical Exam:  Patient seen and examined   General: Well developed.  Alert and cooperative in no acute distress. HENT: atraumatic, neck supple  Eyes: Optic discs: Not tested  Pulmonary: unlabored respiratory effort  Cardiovascular:  Warm well perfused. No peripheral edema  Gastrointestinal: abdomen soft, NT, ND    Neurologic Exam:  Neurological:  Mental Status: Awake, alert, oriented x 4, speech clear and appropriate  Attention: Intact  Language: No aphasia or dysarthria noted  Sensation: light touch to RLE decreased on exam  Coordination: Intact    Musculoskeletal:   Gait: Not tested   Assist devices: None   Tone: normal  Motor strength: Motor strength:    Right  Left    Right  Left    Deltoid  5 5  Hip Flex  5 5   Biceps  5 5  Knee Extensors  5 5   Triceps  5 5  Knee Flexors  5 5   Wrist Ext  5 5  Ankle Dorsiflex. 4 5   Wrist Flex  5 5  Ankle Plantarflex. 3 5   Handgrip  5 5  Ext Papito Longus  5 5   Thumb Ext  5 5             Radiological Findings:   Osseous metastatic disease at T3, T9 and S2. Severe spinal canal stenosis at T8-9 and T11-12 secondary to 4 mm left   paracentral disc protrusions at both of these levels.  There is mild   increased T2 signal intensity within the thoracic spinal cord suggesting   compressive edema at these levels. Extraosseous extension of tumor at T3 extending into the prevertebral   space and ventral epidural space.  There is resulting mild spinal canal   stenosis. Intramuscular metastatic disease within the right paraspinal musculature   at T4-5 as well as the right psoas muscle. Redemonstration of diffuse hepatic metastatic disease, incompletely imaged. Multilevel degenerative changes of the lumbar spine most pronounced at   L4-5 with a disc bulge and facet arthropathy results in moderate spinal canal   stenosis.          Labs  Recent Labs     06/02/19 1817 06/03/19  0558   * 136   CL 93* 99   CO2 22 23   BUN 26* 23*   CREATININE 1.3* 0.9   GLUCOSE 341* 217*     Recent Labs     06/02/19 1817 06/03/19  0558   WBC 6.9 8.5   RBC 3.79* 3.95*       Patient Active Problem List    Diagnosis Date Noted    Spinal cord compression from spine mets 06/03/2019    Chest pain 06/02/2019    Hordeolum externum of left upper eyelid 09/04/2018    Acute bilateral thoracic back pain 09/04/2018    Carpal tunnel syndrome, bilateral 06/23/2018    Acute right flank pain 06/23/2018    Hypothyroidism, adult 05/22/2018    Bilateral shoulder pain 05/22/2018    Fatty liver 05/22/2018    Essential hypertension 05/22/2018    Hyperlipidemia LDL goal <70 05/22/2018    Type 2 diabetes mellitus with renal complication (HCC)     Chemotherapy induced neutropenia ([address] 07/19/2017    Chemotherapy induced nausea and vomiting 06/28/2017    Chemotherapy induced diarrhea 06/28/2017    Secondary malignant neoplasm of muscle of hip ([address] 05/08/2017    Secondary malignant neoplasm of liver ([address] 05/08/2017    Encounter for consultation 04/13/2017    Leiomy[address] [address] 04/12/2017       Assessment:    Patient is a 75year old female with recurrent leiomyosarcoma who presents with RLE weakness and pain. MRI shows herniation of disks between T8 and T9 as well as T11 and T12. Plan:        1. MRI of pelvis to determine tumor position. 2.   Neurologic exams frequency: Q 4 hrs  3. For change in exam MUST contact neurosurgery  Along with critical care or primary team  4. Lovenox for DVT prophylaxis  5. SCDs for DVT prophylaxis  6. Edema: prednisone  10. GI Prophylaxis: protonix  11. Advance diet / activity per primary team  Thanks for consult. Please call w/ questions or decline in mental status         DISPO- To floor, inpatient  MAURICIO Angel-CNP  Neurosurgery Nurse Practitioner  [phone]    Patient was seen and examined with Dr. Pantera Palomino who agrees with above assessment and plan.      Electronically signed by: Jeffery Gamez, 6/4/2019 9:48 AM

## 2019-06-04 NOTE — PROGRESS NOTES
Pt picked up to be transported to [facility]. Pt received morphine 2mg IVP prior to leaving. Pt left with all belongings.

## 2019-06-04 NOTE — H&P
[facility]    6/4/2019 12:18 AM    Patient Information:  George Kwong is a 75 y.o. female [id number]  PCP:  Tasneem Atkinson (Tel: [phone] )    Chief complaint:  Right thigh pain for 2 days. History of Present Illness:  Lamine Patient is a 75 y.o. female who presents with right anterior thigh pain and weakness for 2 days. She has metastatic leiomyosarcoma, followed by [facility]. She is on a chemo pill trial currently with Niraparib Tosylate which she takes at night. She started it a few days ago. She did have sciatica on the right, but that is better. Now she is having pain in her anterior part of her thigh. It is constant. No numbness or tingling. No bowel or bladder dysfunction. MRI of her spine showed spinal compression due to spine mets. No saddle numbness. She also has multiple lung mets and liver mets. She denies shortness of breath or chest pain. REVIEW OF SYSTEMS:   Constitutional: Negative for fever,chills or night sweats  ENT: Negative for rhinorrhea, epistaxis, hoarseness, sore throat. Respiratory: Negative for shortness of breath,wheezing  Cardiovascular: Negative for chest pain, palpitations   Gastrointestinal: Negative for nausea, vomiting, diarrhea  Genitourinary: Negative for polyuria, dysuria   Hematologic/Lymphatic: Negative for bleeding tendency, easy bruising  Musculoskeletal: + for myalgias and arthralgias  Neurologic: Negative for confusion,dysarthria. Skin: Negative for itching,rash  Psychiatric: Negative for depression,anxiety, agitation. Endocrine: Negative for polydipsia,polyuria,heat /cold intolerance. Past Medical History:   has a past medical history of Arthritis, Cancer [address] [address] Diabetes mellitus [address] [address] GERD (gastroesophageal reflux disease), Hyperlipidemia, and Hypertension.      Past Surgical History:   has a past surgical history that includes Tunneled venous port placement; tumor removal; Cholecystectomy;  section; and Dilation and curettage of uterus. Medications:  Current Outpatient Medications on File Prior to Encounter   Medication Sig Dispense Refill    oxyCODONE (OXYCONTIN) 20 MG extended release tablet Take 20 mg by mouth every 12 hours.  Niraparib Tosylate 100 MG CAPS Take 2 capsules by mouth nightly      fenofibrate 160 MG tablet TAKE 1 TABLET BY MOUTH EVERY DAY 90 tablet 1    Continuous Blood Gluc Sensor (FREESTYLE SHEILA SENSOR SYSTEM) MISC USE TO CHECK SUGAR BEFORE MEALS AND AT BEDTIME 1 each 4    JARDIANCE 25 MG tablet TAKE 1 TABLET BY MOUTH EVERY DAY 30 tablet 4    gabapentin (NEURONTIN) 100 MG capsule Take 100 mg by mouth 2 times daily.  oxyCODONE HCl (OXY-IR) 10 MG immediate release tablet Take 10 mg by mouth every 8 hours as needed for Pain.       nabumetone (RELAFEN) 500 MG tablet TAKE 1 TABLET BY MOUTH TWICE A DAY AS NEEDED 180 tablet 1    SYNTHROID 125 MCG tablet Take 1 tablet by mouth every morning (before breakfast) 90 tablet 1    blood glucose test strips (ASCENSIA AUTODISC VI;ONE TOUCH ULTRA TEST VI) strip 1 each by In Vitro route daily QD testing 150 each 1    blood glucose monitor strips Check sugars qac and hs 150 strip 5    LANTUS SOLOSTAR 100 UNIT/ML injection pen INJECT 1O UNITS SUBCUTANEOUSLY AT BEDTIME 15 pen 5    Continuous Blood Gluc  (FREESTYLE SHEILA READER) KELLY Check sugars qac and hs 1 Device 0    insulin lispro (HUMALOG KWIKPEN) 100 UNIT/ML pen Inject 30 Units into the skin 3 times daily (before meals) (Patient taking differently: Inject into the skin 3 times daily (before meals) Per sliding scale) 5 pen 3    Insulin Pen Needle 31G X 5 MM MISC qac and  each 5    Lancets MISC Check sugars qac and hs 150 each 3    metoprolol succinate (TOPROL XL) 100 MG extended release tablet TAKE 1 TABLET BY MOUTH EVERY DAY 90 tablet 3    predniSONE (DELTASONE) 10 MG tablet Take 1 tablet by mouth daily 90 tablet 3  Blood Glucose Monitoring Suppl KELLY Check blood sugars BID prior to breakfast and 2 hours after largest meal and prn feeling \"bad\". DM 2 ( E11.9) 1 Device 0    lidocaine-prilocaine (EMLA) 2.5-2.5 % cream Apply topically as needed for Pain Apply a thin layer 30 minutes prior to treatment and cover. 1 Tube 1    Multiple Vitamins-Minerals (MULTIVITAMIN PO) Take 1 tablet by mouth daily          Allergies: Allergies   Allergen Reactions    Iv Dye [Iodides] Other (See Comments)     Heartburn, chest pain, shakes, vertigo    Codeine     Pcn [Penicillins]         Social History:   reports that she has never smoked. She has never used smokeless tobacco. She reports that she does not drink alcohol or use drugs. Family History:  family history includes Cancer in her brother and brother; Diabetes in her mother; Hypertension in her mother. Physical Exam:  BP (!) 181/84   Pulse 89   Temp 98.1 °F (36.7 °C) (Oral)   Resp 16   SpO2 94%     General appearance:  Appears comfortable. Well nourished  Eyes: conjunctiva clear, sclera anicteric  ENT: Moist mucus membranes  Neck:  Supple, no masses  Cardiovascular: Regular rhythm, normal S1, S2. No murmur, gallop, rub. No edema in lower extremities  Respiratory: Clear to auscultation bilaterally, no wheeze, good inspiratory effort  Gastrointestinal: Abdomen soft, non-tender, not distended, normal bowel sounds  Tumor mid abdomen and left upper chest wall. Musculoskeletal: strength symmetric  Neurology: awake and alert; no facial asymmetry, CN 2-12 appear intact  No speech or motor deficits  Straight leg raise negative. Psychiatry: Appropriate affect.  Not agitated, cooperative  Skin: Warm, dry, no rash    Labs:  CBC:   Lab Results   Component Value Date    WBC 8.5 06/03/2019    RBC 3.95 06/03/2019    RBC 3.73 08/23/2017    HGB 11.1 06/03/2019    HCT 34.5 06/03/2019    MCV 87.4 06/03/2019    MCH 28.0 06/03/2019    MCHC 32.0 06/03/2019    RDW 18.9 06/03/2019     06/03/2019    MPV 8.9 06/03/2019     BMP:    Lab Results   Component Value Date     06/03/2019    K 4.3 06/03/2019    CL 99 06/03/2019    CO2 23 06/03/2019    BUN 23 06/03/2019    CREATININE 0.9 06/03/2019    CALCIUM 9.5 06/03/2019    GFRAA >60 06/03/2019    LABGLOM >60 06/03/2019    GLUCOSE 217 06/03/2019    GLUCOSE 167 08/23/2017         Imaging:   MRI spine  1. Osseous metastatic disease at T3, T9 and S2.   2. Severe spinal canal stenosis at T8-9 and T11-12 secondary to 4 mm left   paracentral disc protrusions at both of these levels.  There is mild   increased T2 signal intensity within the thoracic spinal cord suggesting   compressive edema at these levels. 3. Extraosseous extension of tumor at T3 extending into the prevertebral   space and ventral epidural space.  There is resulting mild spinal canal   stenosis. 4. Intramuscular metastatic disease within the right paraspinal musculature   at T4-5 as well as the right psoas muscle. 5. Redemonstration of diffuse hepatic metastatic disease, incompletely imaged. 6. Multilevel degenerative changes of the lumbar spine most pronounced at   L4-5 with a disc bulge and facet arthropathy results in moderate spinal canal   stenosis. Duplex LE  Tech Comments   Right   No evidence of deep vein or superficial vein thrombosis involving the right   lower extremity and the left common femoral vein. Incidental finding on the right: posterior (proximal) calf, echogenic   vascularized mass measuring 6.1 cm by 4.1 cm. Assessment/Plan:   Principal Problem:    Spinal cord compression from spine mets from leiomyosarcoma  Active Problems:    Leiomyosarcoma (HCC) with mets to spine, liver, lungs    Type 2 diabetes mellitus with renal complication [facility])    Essential hypertension    Will consult Oncology and Neurosurgery. Continue pain control measures. Continue neurontin.   Continue her trial medication   Continue her diabetes medications, add sliding

## 2019-06-04 NOTE — PROGRESS NOTES
Pt admitted to 5510. Pt oriented to room and call light. VSS with exception to BP being elevated. Pt reports pain is a 8/10. Pt denies any needs at this time. Will continue to monitor.

## 2019-06-04 NOTE — CONSULTS
mg, 160 mg, Oral, Daily  gabapentin (NEURONTIN) capsule 100 mg, 100 mg, Oral, BID  empagliflozin (JARDIANCE) tablet TABS 25 mg, 25 mg, Oral, Daily  metoprolol succinate (TOPROL XL) extended release tablet 100 mg, 100 mg, Oral, Daily  Niraparib Tosylate CAPS 2 capsule, 2 capsule, Oral, Nightly  oxyCODONE (OXYCONTIN) extended release tablet 20 mg, 20 mg, Oral, Q12H  oxyCODONE (ROXICODONE) immediate release tablet 10 mg, 10 mg, Oral, Q6H PRN  predniSONE (DELTASONE) tablet 10 mg, 10 mg, Oral, Daily  levothyroxine (SYNTHROID) tablet 125 mcg, 125 mcg, Oral, QAM AC  insulin lispro (HUMALOG) injection pen 0-6 Units, 0-6 Units, Subcutaneous, TID WC  insulin lispro (HUMALOG) injection pen 0-3 Units, 0-3 Units, Subcutaneous, Nightly  morphine (PF) injection 2 mg, 2 mg, Intravenous, Q4H PRN  ondansetron (ZOFRAN) injection 4 mg, 4 mg, Intravenous, Q6H PRN  sodium chloride flush 0.9 % injection 10 mL, 10 mL, Intravenous, 2 times per day  sodium chloride flush 0.9 % injection 10 mL, 10 mL, Intravenous, PRN  acetaminophen (TYLENOL) tablet 650 mg, 650 mg, Oral, Q4H PRN  enoxaparin (LOVENOX) injection 40 mg, 40 mg, Subcutaneous, Daily  glucose (GLUTOSE) 40 % oral gel 15 g, 15 g, Oral, PRN  dextrose 50 % IV solution, 12.5 g, Intravenous, PRN  glucagon (rDNA) injection 1 mg, 1 mg, Intramuscular, PRN  dextrose 5 % solution, 100 mL/hr, Intravenous, PRN  insulin glargine (LANTUS) injection pen 8 Units, 8 Units, Subcutaneous, Nightly  Allergies: Iv dye [iodides];  Codeine; and Pcn [penicillins]    Social History:      Social History     Socioeconomic History    Marital status:      Spouse name: Not on file    Number of children: Not on file    Years of education: Not on file    Highest education level: Not on file   Occupational History    Occupation: retired   Social Needs    Financial resource strain: Not on file    Food insecurity:     Worry: Not on file     Inability: Not on file   [place] needs:     Medical: Not on file     Non-medical: Not on file   Tobacco Use    Smoking status: Never Smoker    Smokeless tobacco: Never Used   Substance and Sexual Activity    Alcohol use: No    Drug use: No    Sexual activity: Not on file   Lifestyle    Physical activity:     Days per week: Not on file     Minutes per session: Not on file    Stress: Not on file   Relationships    Social connections:     Talks on phone: Not on file     Gets together: Not on file     Attends Zoroastrian service: Not on file     Active member of club or organization: Not on file     Attends meetings of clubs or organizations: Not on file     Relationship status: Not on file    Intimate partner violence:     Fear of current or ex partner: Not on file     Emotionally abused: Not on file     Physically abused: Not on file     Forced sexual activity: Not on file   Other Topics Concern    Not on file   Social History Narrative    Not on file          Family History:         Problem Relation Age of Onset    Diabetes Mother     Hypertension Mother     Cancer Brother         kidney    Cancer Brother         melanoma     REVIEW OF SYSTEMS:    ROS per the HPI   Otherwise  10 point ROS negative     PHYSICAL EXAM:      Vitals:  BP (!) 191/89   Pulse 85   Temp 98 °F (36.7 °C) (Oral)   Resp 16   Ht 5' 5\" (1.651 m)   Wt 147 lb 4.3 oz (66.8 kg)   SpO2 98%   BMI 24.51 kg/m²     CONSTITUTIONAL:  awake, alert, cooperative, no apparent distress, and appears stated age NAD  EYES:  pupils equal, round and reactive to light, extra ocular muscles intact, sclera clear, conjunctiva normal  NECK:  Supple, symmetrical, trachea midline, no adenopathy, thyroid symmetric, not enlarged and no tenderness, skin normal  HEMATOLOGIC/LYMPHATICS:  no cervical lymphadenopathy, no supraclavicular lymphadenopathy,   LUNGS:  No increased work of breathing, good air exchange, clear to auscultation bilaterally, no crackles or wheezing  CARDIOVASCULAR:  , regular rate and rhythm, normal S1 and S2, no S3 or S4, and no murmur noted  ABDOMEN:  No scars, normal bowel sounds, soft, non-distended, non-tender, no masses palpated, no hepatosplenomegally      MUSCULOSKELETAL:  There is no redness, warmth, or swelling of the joints. Full range of motion noted. Motor strength is 5 out of 5 all extremities bilaterally. NEUROLOGIC:  Awake, alert, oriented to name, place and time. Cranial nerves II-XII are grossly intact. She has heaviness of her left leg at the thigh area  SKIN:  no bruising or bleeding      DATA:    PT/INR:    Recent Labs     06/02/19  1817 06/03/19  0558   PROT 6.9 7.3     PTT:  No results for input(s): APTT in the last 72 hours. CMP:    Lab Results   Component Value Date     06/03/2019    K 4.3 06/03/2019    CL 99 06/03/2019    CO2 23 06/03/2019    BUN 23 06/03/2019    PROT 7.3 06/03/2019    PROT 6.1 08/23/2017     Magnesium:  No results found for: MG  Phosphorus:  No components found for: PO4  Calcium:  No components found for: CA  CBC:    Lab Results   Component Value Date    WBC 8.5 06/03/2019    RBC 3.95 06/03/2019    RBC 3.73 08/23/2017    HGB 11.1 06/03/2019    HCT 34.5 06/03/2019    MCV 87.4 06/03/2019    RDW 18.9 06/03/2019     06/03/2019     DIFF:    Lab Results   Component Value Date    MCV 87.4 06/03/2019    RDW 18.9 06/03/2019      LDH:  @labcrnt(LDH)@  Uric Acid:  @labcrnt(URIC)@    Radiology Review: Xr Chest Standard (2 Vw)    Result Date: 6/3/2019  EXAMINATION: TWO XRAY VIEWS OF THE CHEST 6/2/2019 5:03 pm COMPARISON: None. HISTORY: ORDERING SYSTEM PROVIDED HISTORY: chest pain TECHNOLOGIST PROVIDED HISTORY: Reason for exam:->chest pain Ordering Physician Provided Reason for Exam: Chest Pain (since yesterday. in cancer trial. also reports \"heaviness\" in right leg. ) Acuity: Chronic Type of Exam: Initial FINDINGS: Cardiac and mediastinal contours are within normal limits.  Right upper extremity central venous catheter terminates over the cavoatrial of the mA/kV was utilized to reduce the radiation dose to as low as reasonably achievable. COMPARISON: No prior chest CT for comparison. Comparison made to recent chest x-ray dated 06/02/2019 at 1713 hours HISTORY: ORDERING SYSTEM PROVIDED HISTORY: right sided chest/back pain, cough last week, patient has leiomyosarcoma. treated for bronchitis last week. TECHNOLOGIST PROVIDED HISTORY: Ordering Physician Provided Reason for Exam: right sided chest/back pain, cough last week, patient has leiomyosarcoma. treated for bronchitis last week. Acuity: Acute Type of Exam: Initial Patient with history of leiomyosarcoma. Now presents with multiple nodular densities on recent chest x-ray. FINDINGS: Mediastinum: The visualized thyroid is unremarkable. There is a mildly enlarged 2.2 x 1.9 cm right paratracheal lymph node, concerning for metastatic disease. There is also an enlarged right hilar lymph node noted measuring approximately 2.1 x 2.0 cm, concerning for metastatic disease. No additional pathologic mediastinal or hilar lymphadenopathy is identified. There is atherosclerotic disease of the thoracic aorta, without aneurysm. No pericardial abnormality is identified. Lungs/pleura: The central airways are patent. There are multiple scattered various sized pulmonary masses and pulmonary nodules throughout both lungs, the largest measuring approximately 6.2 x 5.7 cm along the right minor fissure within both the anterior right upper lobe as well as the right middle lobe. These multifocal nodules/masses are highly concerning for underlying intra pulmonary metastatic disease. There is mild subpleural opacity within the anterior left upper lobe, which could represent either atelectasis or parenchymal scar. There is minimal dependent atelectasis within the bilateral lower lobes. No additional focus of airspace consolidation is identified. There is no evidence of a pneumothorax or pleural effusion. Upper Abdomen:  There are multiple scattered various sized heterogeneous though primarily low-attenuation mass lesions scattered throughout the liver parenchyma consistent with intrahepatic metastatic disease. The largest of these measures approximately 11.3 x 6.2 cm within the inferior right hepatic lobe. The patient is status post cholecystectomy. There is a 1.2 cm metastatic deposit within the right perirenal space. There are multiple nicola metastases within the upper abdomen, the largest being a partially visualized necrotic mass measuring approximately 6.5 x 5.4 cm within the left upper quadrant. Visualized portions of the adrenal glands are unremarkable. Soft Tissues/Bones: There is an approximate 1.9 x 1.9 cm metastatic deposit within the subcutaneous soft tissues of the upper left chest just anterior to the pectoral muscle. There is also a 3.7 x 3.7 cm heterogeneous and partially necrotic metastatic nodule within the left supraclavicular space. There is an approximate 1.2 cm metastatic deposit within the right breast soft tissues. There is a 1.8 cm metastatic deposit within the soft tissues of the right lateral chest wall. There is no osteolytic lesion within the T3 vertebral body with associated pathologic fracture cortical breakthrough and extension into the paraspinal soft tissues. There is a mixed osteolytic and osteoblastic lesion within T9 without definite pathologic fracture. No additional osteolytic or osteoblastic lesion is identified. 1. Multiple various sized scattered pulmonary nodules and masses throughout the bilateral lung parenchyma, consistent with intra pulmonary metastatic disease. 2. Diffuse intrahepatic metastatic disease. 3. Metastatic mediastinal and right hilar lymphadenopathy. 4. Upper abdominal metastatic lymphadenopathy. 5. Metastatic deposit within the right perirenal space.  6. Multiple scattered subcutaneous soft tissue metastatic deposits within the upper left chest, left supraclavicular space, right breast, and right lateral chest wall. 7. Osseous metastatic disease within the thoracic spine, as detailed above, with associated pathologic fracture of T3 and paraspinal extension. RECOMMENDATIONS: Suggest comparison with any prior outside studies to assess for the tempo and progression of the patient's metastatic disease. Additionally, consider a follow-up CT abdomen/pelvis with IV and oral contrast for more detailed evaluation of the patient's abdominal findings. Mri Thoracic Spine Wo Contrast    Result Date: 6/3/2019  EXAMINATION: MRI OF THE LUMBAR SPINE WITHOUT CONTRAST; MRI OF THE THORACIC SPINE WITHOUT CONTRAST, 6/3/2019 12:12 pm TECHNIQUE: Multiplanar multisequence MRI of the lumbar spine was performed without the administration of intravenous contrast.; Multiplanar multisequence MRI of the thoracic spine was performed without the administration of intravenous contrast. COMPARISON: CT chest 06/03/2019. HISTORY: ORDERING SYSTEM PROVIDED HISTORY: spinal cord compression from cancer suspected, right leg weakness TECHNOLOGIST PROVIDED HISTORY: Ordering Physician Provided Reason for Exam: 75 y.o. female with PMH of HTN, DM, metastatic leiomyosarcoma who presented to [facility] for chest, back and leg pain all on the right side. She was treated with pain medication, cardiac work up and PE work up neg. Acuity: Acute Type of Exam: Initial FINDINGS: MRI thoracic: There is diffuse abnormal bone marrow signal intensity with decreased T1 signal intensity and heterogeneous increased T2 signal intensity within the T3 and T9 vertebral bodies. There is extraosseous extension of disease into the prevertebral space at T3 as well as into the ventral epidural space at T3. There is mild spinal canal stenosis at the T3 level. There is a soft tissue mass within the right paraspinal musculature centered at the T4-5 level. This mass measures 2.0 x 3.7 x 4.9 cm in AP by transverse by craniocaudal dimension.  There is severe spinal canal stenosis at T8-9 secondary to a focal 4 mm left paracentral disc protrusion. This flattens the ventral surface of the spinal cord. There is mild abnormal increased T2 signal intensity within the thoracic spinal cord at this level. There is severe spinal canal stenosis at T11-12 secondary to a focal 4 mm left paracentral disc protrusion. Subtle increased T2 signal intensity is noted within the thoracic spinal cord at this level. There is moderate spinal canal stenosis at T5-6 secondary to a focal 3 mm central disc protrusion. Diffuse hepatic metastatic disease is noted, incompletely imaged. MRI lumbar: There is normal alignment of the lumbar spine. There is no acute fracture or listhesis. There is a 1 cm focus of bone marrow signal abnormality within the S2 segment consistent with a focus of osseous metastatic disease. Intervertebral disc heights are maintained. There is an intramuscular metastatic lesion within the right psoas muscle measuring 1.1 x 1.7 cm. L1-L2: There is no disc bulge or protrusion present. There is no significant spinal canal stenosis or neural foraminal narrowing present. L2-L3: There is no disc bulge or protrusion present. There is no significant spinal canal stenosis or neural foraminal narrowing present. L3-L4: There is a disc bulge, facet arthropathy and thickening of the ligamentum flavum resulting in mild spinal canal stenosis. There is no significant neural foraminal narrowing. L4-L5: There is a disc bulge and facet arthropathy. There is moderate spinal canal stenosis. No significant neural foraminal narrowing is present. L5-S1: There is a disc bulge and facet arthropathy. There is moderate left neural foraminal narrowing. No spinal canal stenosis or right neural foraminal narrowing is present.      1. Osseous metastatic disease at T3, T9 and S2. 2. Severe spinal canal stenosis at T8-9 and T11-12 secondary to 4 mm left paracentral disc protrusions at both of these levels. There is mild increased T2 signal intensity within the thoracic spinal cord suggesting compressive edema at these levels. 3. Extraosseous extension of tumor at T3 extending into the prevertebral space and ventral epidural space. There is resulting mild spinal canal stenosis. 4. Intramuscular metastatic disease within the right paraspinal musculature at T4-5 as well as the right psoas muscle. 5. Redemonstration of diffuse hepatic metastatic disease, incompletely imaged. 6. Multilevel degenerative changes of the lumbar spine most pronounced at L4-5 with a disc bulge and facet arthropathy results in moderate spinal canal stenosis. The findings were sent to the Radiology Results [id number] at 1:42 pm on 6/3/2019to be communicated to a licensed caregiver. Mri Lumbar Spine Wo Contrast    Result Date: 6/3/2019  EXAMINATION: MRI OF THE LUMBAR SPINE WITHOUT CONTRAST; MRI OF THE THORACIC SPINE WITHOUT CONTRAST, 6/3/2019 12:12 pm TECHNIQUE: Multiplanar multisequence MRI of the lumbar spine was performed without the administration of intravenous contrast.; Multiplanar multisequence MRI of the thoracic spine was performed without the administration of intravenous contrast. COMPARISON: CT chest 06/03/2019. HISTORY: ORDERING SYSTEM PROVIDED HISTORY: spinal cord compression from cancer suspected, right leg weakness TECHNOLOGIST PROVIDED HISTORY: Ordering Physician Provided Reason for Exam: 75 y.o. female with PMH of HTN, DM, metastatic leiomyosarcoma who presented to [facility] for chest, back and leg pain all on the right side. She was treated with pain medication, cardiac work up and PE work up neg. Acuity: Acute Type of Exam: Initial FINDINGS: MRI thoracic: There is diffuse abnormal bone marrow signal intensity with decreased T1 signal intensity and heterogeneous increased T2 signal intensity within the T3 and T9 vertebral bodies.   There is extraosseous extension of disease into the disc bulge and facet arthropathy. There is moderate left neural foraminal narrowing. No spinal canal stenosis or right neural foraminal narrowing is present. 1. Osseous metastatic disease at T3, T9 and S2. 2. Severe spinal canal stenosis at T8-9 and T11-12 secondary to 4 mm left paracentral disc protrusions at both of these levels. There is mild increased T2 signal intensity within the thoracic spinal cord suggesting compressive edema at these levels. 3. Extraosseous extension of tumor at T3 extending into the prevertebral space and ventral epidural space. There is resulting mild spinal canal stenosis. 4. Intramuscular metastatic disease within the right paraspinal musculature at T4-5 as well as the right psoas muscle. 5. Redemonstration of diffuse hepatic metastatic disease, incompletely imaged. 6. Multilevel degenerative changes of the lumbar spine most pronounced at L4-5 with a disc bulge and facet arthropathy results in moderate spinal canal stenosis. The findings were sent to the Radiology Results [id number] at 1:42 pm on 6/3/2019to be communicated to a licensed caregiver.      Vl Extremity Venous Right    Result Date: 6/3/2019  Vascular Lower Extremities DVT Study Procedure -- PRELIMINARY SONOGRAPHER REPORT --   Demographics   Patient Name      [facility]   Date of Study     06/03/2019          Gender              Female   Patient Number    [id number]          Date of Birth       1955   Visit Number      [id number]           Age                 75 year(s)   Accession Number  [id number]           Room Number         4130   Corporate ID      [id number]            Jagruti Gonzalez RVT                                                            CCT   Ordering          Renate Langston MD  Interpreting        Alina Cantu,  Physician                             Physician           MD  Procedure Type of Study:   Veins:Lower Extremities DVT Study, VL EXTREMITY VENOUS DUPLEX RIGHT. Tech Comments Right No evidence of deep vein or superficial vein thrombosis involving the right lower extremity and the left common femoral vein. Incidental finding on the right: posterior (proximal) calf, echogenic vascularized mass measuring 6.1 cm by 4.1 cm. Problem List  Patient Active Problem List   Diagnosis    Le[facility]omy[address] ([facility] [address]    Encounter for consultation    Secondary malignant neoplasm of muscle of hip ([address]    Secondary malignant neoplasm of liver ([address]    Chemotherapy induced nausea and vomiting    Chemotherapy induced diarrhea    Chemotherapy induced neutropenia ([address]    Type 2 diabetes mellitus with renal complication (HCC)    Hypothyroidism, adult    Bilateral shoulder pain    Fatty liver    Essential hypertension    Hyperlipidemia LDL goal <70    Carpal tunnel syndrome, bilateral    Acute right flank pain    Hordeolum externum of left upper eyelid    Acute bilateral thoracic back pain    Chest pain    Spinal cord compression from spine mets       IMPRESSION/RECOMMENDATIONS:  Recurrent stage iv leiomyosarcoma Heavily pretreated currently on a clinical trial at . New onset right leg weakness and pain noted to have bulky disease on her right hip with a protruding cutaneous lesion as well. Also noted on her MRI having lesions in T3 9 and S1 and also extensive disks protrusion at 8 9. Actually reviewed the films with Dr. Michelle Sears and Reyna Acosta of neurosurgery. Leg pain likely related to her locally advanced tumor on the right hip less likely from the disease seen in her spinal column. There is no cord compression except for the herniated disks. Neurosurgery did discuss decompression laminectomy with the patient and given the extent of surgery and also comorbid tumors in her vertebral bodies likely would not tolerate or have a successful surgical outcome.     Await the results of the MRI in the right will certainly treat the spinal lesion so as to stop propagation and hopefully treatment to them to the hip may help with her pain on the right. With new onset left leg weakness  Worsening pain  She had an mri showing concerns at t3 t9 and s2  She has disc protrusions at t8/9   She has extraooseous extension of tumor at t3 into the prevertebral and ventral epidural space     Her leg weakness is new to me-I have not seen her in a few [facility] due to her going on trial at [facility]     She has known leiomyosarcoma with mult tx (I have no standard chemos left to offer her outside of single agent ifosfamide which would likely be difficult on her marrow as her gem taxotere was troublesome )     Now on phase 1 trial at    I had seen her and referred her for this  She is being transferred to [facility] for nsurg eval and will alert radiation oncology   Based on her mri she has some canal stenosis and tumor extension  She is on high dose steroids  UPon [facility] eval =I suspect a decision on radiation vs surgery will be made which most casandra will be radiation-?  Is it t3 causing the issue   Pt was awatiing transfer when I saw her tonight around 6 pm

## 2019-06-04 NOTE — PROGRESS NOTES
Nutrition Assessment    Type and Reason for Visit: Initial, Positive Nutrition Screen    Nutrition Recommendations:   · Continue Carb Controlled diet, monitor and record all po intake  · Start Glucerna, Daily to aid w/ intake  · Monitor weights, diet tolerance, and nutritional adequacy     Nutrition Assessment: Positive nutritional screen for weight loss. Pt is nutritionally at risk d/t increased needs for leiomyosarcoma w/ mets to the lungs, liver, and bone. Wt has fluctuated from 150-163lb; however, now wt down to 147lb 4.3 oz. Wt loss not considered significant. Will start Glucerna, Daily to aid w/ increased needs and monitor nutritional adequacy. Malnutrition Assessment:  · Malnutrition Status: At risk for malnutrition  · Context: Chronic illness  · Findings of the 6 clinical characteristics of malnutrition (Minimum of 2 out of 6 clinical characteristics is required to make the diagnosis of moderate or severe Protein Calorie Malnutrition based on AND/ASPEN Guidelines):  1. Energy Intake-Less than or equal to 75% of estimated energy requirement, Greater than or equal to 5 days(per pt )    2. Weight Loss-No significant weight loss,    3. Fat Loss-No significant subcutaneous fat loss,    4. Muscle Loss-No significant muscle mass loss,    5. Fluid Accumulation-No significant fluid accumulation,    6.  Strength-Not measured    Nutrition Risk Level:  Moderate    Nutrient Needs:  · Estimated Daily Total Kcal: 4567-5354(67-10)  · Estimated Daily Protein (g): 80-94(1.2-1.4)  · Estimated Daily Total Fluid (ml/day): 5590-8337    Nutrition Diagnosis:   · Problem: Inadequate oral intake  · Etiology: related to Catabolic illness     Signs and symptoms:  as evidenced by Diet history of poor intake, Patient report of, Weight loss    Objective Information:  · Nutrition-Focused Physical Findings: trace edema RLE; LBM 6/3 per pt; no n/v/d/c   · Wound Type: None  · Current Nutrition Therapies:  · Oral Diet Orders: Carb

## 2019-06-04 NOTE — PLAN OF CARE
Problem: Pain:  Goal: Pain level will decrease  Outcome: Ongoing   Pt complains of pain to right leg. Pt denies ant pain intervention at this time. Pt educated about pharmacological pain relief options. Pt resting comfortably in bed. Will continue to monitor. Problem: Falls - Risk of:  Goal: Will remain free from falls  Outcome: Ongoing   Patient is a high fall risk. All fall precautions in place: bed alarm on, nonskid socks on pt, call light within reach, bed locked in lowest position. Will continue to monitor pt safety.

## 2019-06-04 NOTE — PROGRESS NOTES
supple, symmetrical, trachea midline and thyroid not enlarged, symmetric, no tenderness/mass/nodules  Lungs: clear to auscultation bilaterally  Heart: regular rate and rhythm, S1, S2 normal, no murmur, click, rub or gallop  Abdomen: soft, non-tender; bowel sounds normal; no masses,  no organomegaly  Extremities: extremities normal, atraumatic, no cyanosis or edema  Pulses: 2+ and symmetric    LABS:  Recent Labs     06/02/19 1817 06/03/19  0558   WBC 6.9 8.5   HGB 10.7* 11.1*   HCT 32.9* 34.5*    169                                                                    Recent Labs     06/02/19 1817 06/03/19  0558   * 136   K 3.6 4.3   CL 93* 99   CO2 22 23   BUN 26* 23*   CREATININE 1.3* 0.9   GLUCOSE 341* 217*     Recent Labs     06/02/19 1817 06/03/19  0558   AST 22 26   ALT 11 12   BILITOT 0.6 0.6   ALKPHOS 106 108     Recent Labs     06/02/19 1817 06/02/19  2050   TROPONINI <0.01 <0.01     No results for input(s): BNP in the last 72 hours. No results for input(s): CHOL, HDL in the last 72 hours. Invalid input(s): LDLCALCU  No results for input(s): INR in the last 72 hours. Assessment & Plan:    Patient Active Problem List:     Leiomyosarcoma [facility])     Encounter for consultation     Secondary malignant neoplasm of muscle of hip ([facility]     Secondary malignant neoplasm of liver ([facility] [facility]     Chemotherapy induced nausea and vomiting     Chemotherapy induced diarrhea     Chemotherapy induced neutropenia (HCC)     Type 2 diabetes mellitus with renal complication (HCC)     Hypothyroidism, adult     Bilateral shoulder pain     Fatty liver     Essential hypertension     Hyperlipidemia LDL goal <70     Carpal tunnel syndrome, bilateral     Acute right flank pain     Hordeolum externum of left upper eyelid     Acute bilateral thoracic back pain     Chest pain     Spinal cord compression from spine mets      76 yo admitted with spinal compression from mets from leiomyosarcoma.   She is doing ok right now oncology and neurosurgery have been consulted, for her bp I have added ace inhibitor and prn medications. For sugars she is on a lot of short acting insulin at home 30 tid but long acting is just ten units lantus, will adjust insulin, she is not eating or drinking much right now so will be cautious. Discussed case with nurse. The patient and / or the family were informed of the results of any tests, a time was given to answer questions, a plan was proposed and they agreed with plan.   Disposition: continue inpatient stay  Full Mariam Phillip MD [address]

## 2019-06-04 NOTE — PROGRESS NOTES
Patient A/O x4. VSS throughout shift this far. Blood pressure has been elevated since admission-Dr. Angela Pugh notified and new medication orders were placed. Patient states pain is controlled more than usual and satisfied with current pain control measures. Patient's BG has been elevated-Dr. Angela Pugh notified and insulin doses were adjusted. Dietician saw patient today and ordered a dietary supplement per patients request. Patient denies nausea/vomiting and is tolerating food and fluids well. Patient currently in radiation treatment. MRI has been ordered and checklist is complete. Will continue to monitor.

## 2019-06-05 DIAGNOSIS — E03.9 HYPOTHYROIDISM, ADULT: ICD-10-CM

## 2019-06-05 LAB
GLUCOSE BLD-MCNC: 133 MG/DL (ref 70–99)
GLUCOSE BLD-MCNC: 181 MG/DL (ref 70–99)
GLUCOSE BLD-MCNC: 249 MG/DL (ref 70–99)
GLUCOSE BLD-MCNC: 275 MG/DL (ref 70–99)
PERFORMED ON: ABNORMAL

## 2019-06-05 PROCEDURE — 6360000002 HC RX W HCPCS: Performed by: NURSE PRACTITIONER

## 2019-06-05 PROCEDURE — 6360000002 HC RX W HCPCS: Performed by: FAMILY MEDICINE

## 2019-06-05 PROCEDURE — C9113 INJ PANTOPRAZOLE SODIUM, VIA: HCPCS | Performed by: NURSE PRACTITIONER

## 2019-06-05 PROCEDURE — 6370000000 HC RX 637 (ALT 250 FOR IP): Performed by: INTERNAL MEDICINE

## 2019-06-05 PROCEDURE — 77295 3-D RADIOTHERAPY PLAN: CPT

## 2019-06-05 PROCEDURE — 6370000000 HC RX 637 (ALT 250 FOR IP): Performed by: FAMILY MEDICINE

## 2019-06-05 PROCEDURE — 1200000000 HC SEMI PRIVATE

## 2019-06-05 PROCEDURE — 77417 THER RADIOLOGY PORT IMAGE(S): CPT

## 2019-06-05 PROCEDURE — 77334 RADIATION TREATMENT AID(S): CPT

## 2019-06-05 PROCEDURE — 77412 RADIATION TX DELIVERY LVL 3: CPT

## 2019-06-05 PROCEDURE — 2580000003 HC RX 258: Performed by: FAMILY MEDICINE

## 2019-06-05 PROCEDURE — 77300 RADIATION THERAPY DOSE PLAN: CPT

## 2019-06-05 RX ORDER — DEXAMETHASONE 4 MG/1
4 TABLET ORAL EVERY 12 HOURS SCHEDULED
Status: DISCONTINUED | OUTPATIENT
Start: 2019-06-09 | End: 2019-06-06 | Stop reason: HOSPADM

## 2019-06-05 RX ORDER — POLYETHYLENE GLYCOL 3350 17 G/17G
17 POWDER, FOR SOLUTION ORAL DAILY
Status: DISCONTINUED | OUTPATIENT
Start: 2019-06-05 | End: 2019-06-06 | Stop reason: HOSPADM

## 2019-06-05 RX ORDER — DEXAMETHASONE SODIUM PHOSPHATE 4 MG/ML
4 INJECTION, SOLUTION INTRA-ARTICULAR; INTRALESIONAL; INTRAMUSCULAR; INTRAVENOUS; SOFT TISSUE EVERY 6 HOURS
Status: DISCONTINUED | OUTPATIENT
Start: 2019-06-05 | End: 2019-06-06 | Stop reason: HOSPADM

## 2019-06-05 RX ORDER — DEXAMETHASONE 4 MG/1
4 TABLET ORAL DAILY
Status: DISCONTINUED | OUTPATIENT
Start: 2019-06-12 | End: 2019-06-06 | Stop reason: HOSPADM

## 2019-06-05 RX ORDER — DOCUSATE SODIUM 100 MG/1
100 CAPSULE, LIQUID FILLED ORAL 2 TIMES DAILY
Status: DISCONTINUED | OUTPATIENT
Start: 2019-06-05 | End: 2019-06-06 | Stop reason: HOSPADM

## 2019-06-05 RX ORDER — DEXAMETHASONE 4 MG/1
4 TABLET ORAL EVERY 8 HOURS SCHEDULED
Status: DISCONTINUED | OUTPATIENT
Start: 2019-06-07 | End: 2019-06-06 | Stop reason: HOSPADM

## 2019-06-05 RX ORDER — NALOXONE HYDROCHLORIDE 0.4 MG/ML
0.4 INJECTION, SOLUTION INTRAMUSCULAR; INTRAVENOUS; SUBCUTANEOUS PRN
Status: DISCONTINUED | OUTPATIENT
Start: 2019-06-05 | End: 2019-06-06 | Stop reason: HOSPADM

## 2019-06-05 RX ORDER — LEVOTHYROXINE SODIUM 125 MCG
TABLET ORAL
Qty: 90 TABLET | Refills: 1 | Status: SHIPPED | OUTPATIENT
Start: 2019-06-05

## 2019-06-05 RX ADMIN — OXYCODONE HYDROCHLORIDE 20 MG: 20 TABLET, FILM COATED, EXTENDED RELEASE ORAL at 03:39

## 2019-06-05 RX ADMIN — INSULIN LISPRO 6 UNITS: 100 INJECTION, SOLUTION INTRAVENOUS; SUBCUTANEOUS at 17:48

## 2019-06-05 RX ADMIN — Medication 10 ML: at 08:09

## 2019-06-05 RX ADMIN — FENOFIBRATE 160 MG: 160 TABLET ORAL at 08:09

## 2019-06-05 RX ADMIN — OXYCODONE HYDROCHLORIDE 10 MG: 5 TABLET ORAL at 14:36

## 2019-06-05 RX ADMIN — OXYCODONE HYDROCHLORIDE 30 MG: 20 TABLET, FILM COATED, EXTENDED RELEASE ORAL at 19:21

## 2019-06-05 RX ADMIN — DEXAMETHASONE SODIUM PHOSPHATE 4 MG: 4 INJECTION, SOLUTION INTRAMUSCULAR; INTRAVENOUS at 22:38

## 2019-06-05 RX ADMIN — METOPROLOL SUCCINATE 100 MG: 100 TABLET, EXTENDED RELEASE ORAL at 08:08

## 2019-06-05 RX ADMIN — OXYCODONE HYDROCHLORIDE 10 MG: 5 TABLET ORAL at 08:20

## 2019-06-05 RX ADMIN — LISINOPRIL 10 MG: 10 TABLET ORAL at 08:08

## 2019-06-05 RX ADMIN — INSULIN LISPRO 10 UNITS: 100 INJECTION, SOLUTION INTRAVENOUS; SUBCUTANEOUS at 17:48

## 2019-06-05 RX ADMIN — DOCUSATE SODIUM 100 MG: 100 CAPSULE, LIQUID FILLED ORAL at 17:41

## 2019-06-05 RX ADMIN — INSULIN GLARGINE 15 UNITS: 100 INJECTION, SOLUTION SUBCUTANEOUS at 21:15

## 2019-06-05 RX ADMIN — ENOXAPARIN SODIUM 40 MG: 40 INJECTION SUBCUTANEOUS at 08:08

## 2019-06-05 RX ADMIN — PANTOPRAZOLE SODIUM 40 MG: 40 INJECTION, POWDER, FOR SOLUTION INTRAVENOUS at 08:08

## 2019-06-05 RX ADMIN — GABAPENTIN 100 MG: 100 CAPSULE ORAL at 08:08

## 2019-06-05 RX ADMIN — DEXAMETHASONE SODIUM PHOSPHATE 4 MG: 4 INJECTION, SOLUTION INTRAMUSCULAR; INTRAVENOUS at 17:40

## 2019-06-05 RX ADMIN — GABAPENTIN 100 MG: 100 CAPSULE ORAL at 21:01

## 2019-06-05 RX ADMIN — PREDNISONE 10 MG: 10 TABLET ORAL at 08:09

## 2019-06-05 RX ADMIN — OXYCODONE HYDROCHLORIDE 20 MG: 20 TABLET, FILM COATED, EXTENDED RELEASE ORAL at 12:27

## 2019-06-05 RX ADMIN — DEXAMETHASONE SODIUM PHOSPHATE 4 MG: 4 INJECTION, SOLUTION INTRAMUSCULAR; INTRAVENOUS at 10:08

## 2019-06-05 RX ADMIN — LEVOTHYROXINE SODIUM 125 MCG: 0.12 TABLET ORAL at 06:26

## 2019-06-05 RX ADMIN — MORPHINE SULFATE 2 MG: 2 INJECTION, SOLUTION INTRAMUSCULAR; INTRAVENOUS at 10:08

## 2019-06-05 RX ADMIN — DOCUSATE SODIUM 100 MG: 100 CAPSULE, LIQUID FILLED ORAL at 21:01

## 2019-06-05 RX ADMIN — INSULIN LISPRO 5 UNITS: 100 INJECTION, SOLUTION INTRAVENOUS; SUBCUTANEOUS at 21:13

## 2019-06-05 RX ADMIN — MORPHINE SULFATE 2 MG: 2 INJECTION, SOLUTION INTRAMUSCULAR; INTRAVENOUS at 21:01

## 2019-06-05 RX ADMIN — INSULIN LISPRO 3 UNITS: 100 INJECTION, SOLUTION INTRAVENOUS; SUBCUTANEOUS at 12:24

## 2019-06-05 RX ADMIN — POLYETHYLENE GLYCOL (3350) 17 G: 17 POWDER, FOR SOLUTION ORAL at 17:42

## 2019-06-05 RX ADMIN — NALOXEGOL OXALATE 25 MG: 25 TABLET, FILM COATED ORAL at 17:40

## 2019-06-05 RX ADMIN — INSULIN LISPRO 10 UNITS: 100 INJECTION, SOLUTION INTRAVENOUS; SUBCUTANEOUS at 12:24

## 2019-06-05 RX ADMIN — Medication 10 ML: at 21:19

## 2019-06-05 RX ADMIN — INSULIN LISPRO 10 UNITS: 100 INJECTION, SOLUTION INTRAVENOUS; SUBCUTANEOUS at 08:22

## 2019-06-05 ASSESSMENT — PAIN DESCRIPTION - PROGRESSION
CLINICAL_PROGRESSION: NOT CHANGED

## 2019-06-05 ASSESSMENT — PAIN DESCRIPTION - ONSET
ONSET: ON-GOING

## 2019-06-05 ASSESSMENT — PAIN DESCRIPTION - PAIN TYPE
TYPE: ACUTE PAIN

## 2019-06-05 ASSESSMENT — PAIN DESCRIPTION - DESCRIPTORS
DESCRIPTORS: ACHING

## 2019-06-05 ASSESSMENT — PAIN SCALES - GENERAL
PAINLEVEL_OUTOF10: 7
PAINLEVEL_OUTOF10: 5
PAINLEVEL_OUTOF10: 8
PAINLEVEL_OUTOF10: 7
PAINLEVEL_OUTOF10: 5
PAINLEVEL_OUTOF10: 7
PAINLEVEL_OUTOF10: 0
PAINLEVEL_OUTOF10: 4
PAINLEVEL_OUTOF10: 10
PAINLEVEL_OUTOF10: 4
PAINLEVEL_OUTOF10: 8
PAINLEVEL_OUTOF10: 9
PAINLEVEL_OUTOF10: 7

## 2019-06-05 ASSESSMENT — PAIN - FUNCTIONAL ASSESSMENT
PAIN_FUNCTIONAL_ASSESSMENT: ACTIVITIES ARE NOT PREVENTED
PAIN_FUNCTIONAL_ASSESSMENT: ACTIVITIES ARE NOT PREVENTED
PAIN_FUNCTIONAL_ASSESSMENT: PREVENTS OR INTERFERES SOME ACTIVE ACTIVITIES AND ADLS

## 2019-06-05 ASSESSMENT — PAIN DESCRIPTION - ORIENTATION
ORIENTATION: RIGHT;LOWER
ORIENTATION: LOWER;RIGHT
ORIENTATION: RIGHT;LOWER
ORIENTATION: LOWER;RIGHT

## 2019-06-05 ASSESSMENT — PAIN DESCRIPTION - FREQUENCY
FREQUENCY: CONTINUOUS

## 2019-06-05 ASSESSMENT — PAIN DESCRIPTION - LOCATION
LOCATION: BACK
LOCATION: BACK;HIP

## 2019-06-05 NOTE — PROGRESS NOTES
Oncology [facility]  Progress Note    Subjective:     States her pain is improved. Denies bowel or bladder dysfunction. No LE weakness    Review of Systems:     Review of Systems   Constitutional: Negative. HENT: Negative. Eyes: Negative. Respiratory: Negative. Cardiovascular: Negative. Gastrointestinal: Negative. Endocrine: Negative. Genitourinary: Negative. Musculoskeletal: Positive for back pain and myalgias. Skin: Negative. Allergic/Immunologic: Negative. Neurological: Negative. Hematological: Negative. Objective:     Oncology History:  Patient is a 76-year-old female with recurrent stage IV leiomyosarcoma. Transferred from [facility] for possible cord compression.     Per her primary oncologist, Hailee Osborn, provides her cancer history as below:  She was diagnosed a number of years ago and she had surgery and adjuvant adriamycin/ifos regimen under the care of Dr Brooks Severs  She felt a lump in her chest wall about 2-3 years ago with biopsy proven mets at that time  She had many subq mets at dx as well as liver mets  She has been through many options for treatment  She had adriamycin/lartruvo   She progressed on this  She went on an immunotherapy trial at [facility] -which did not cause ultimate long term response  She had yondelis which failed   She had taxotere/gemzar that failed  She was sent to  for a clinical trial with a parp inhibitor on a phase 1 trial (niraparib)  She is currnetly on this  She has sub q mets/liver mets and some bone mets  She had palliative xrt to her shoulder     MRI here shows some bone mets along with some canal stenosis -which may be disc related along with epirdural extension of tumor at T3  Her left leg is heavy and has to use her arm to lift her leg.  Multiple intramuscular masses in hip and LE as seen on MRI     She had not been ready to consider palliative care -  She was transferred to [facility] for nsurg & rad onc consultation. Dr Kaycee Friedman has begun XRT to T3.       Medications    Current Facility-Administered Medications: dexamethasone (DECADRON) injection 4 mg, 4 mg, Intravenous, Q6H **FOLLOWED BY** [START ON 6/7/2019] dexamethasone (DECADRON) tablet 4 mg, 4 mg, Oral, 3 times per day **FOLLOWED BY** [START ON 6/9/2019] dexamethasone (DECADRON) tablet 4 mg, 4 mg, Oral, 2 times per day **FOLLOWED BY** [START ON 6/12/2019] dexamethasone (DECADRON) tablet 4 mg, 4 mg, Oral, Daily  fenofibrate tablet 160 mg, 160 mg, Oral, Daily  gabapentin (NEURONTIN) capsule 100 mg, 100 mg, Oral, BID  empagliflozin (JARDIANCE) tablet TABS 25 mg, 25 mg, Oral, Daily  metoprolol succinate (TOPROL XL) extended release tablet 100 mg, 100 mg, Oral, Daily  Niraparib Tosylate CAPS 2 capsule, 2 capsule, Oral, Nightly  oxyCODONE (OXYCONTIN) extended release tablet 20 mg, 20 mg, Oral, Q12H  oxyCODONE (ROXICODONE) immediate release tablet 10 mg, 10 mg, Oral, Q6H PRN  levothyroxine (SYNTHROID) tablet 125 mcg, 125 mcg, Oral, QAM AC  morphine (PF) injection 2 mg, 2 mg, Intravenous, Q4H PRN  ondansetron (ZOFRAN) injection 4 mg, 4 mg, Intravenous, Q6H PRN  sodium chloride flush 0.9 % injection 10 mL, 10 mL, Intravenous, 2 times per day  sodium chloride flush 0.9 % injection 10 mL, 10 mL, Intravenous, PRN  acetaminophen (TYLENOL) tablet 650 mg, 650 mg, Oral, Q4H PRN  enoxaparin (LOVENOX) injection 40 mg, 40 mg, Subcutaneous, Daily  glucose (GLUTOSE) 40 % oral gel 15 g, 15 g, Oral, PRN  dextrose 50 % IV solution, 12.5 g, Intravenous, PRN  glucagon (rDNA) injection 1 mg, 1 mg, Intramuscular, PRN  dextrose 5 % solution, 100 mL/hr, Intravenous, PRN  pantoprazole (PROTONIX) injection 40 mg, 40 mg, Intravenous, Daily  insulin lispro (HUMALOG) injection pen 10 Units, 10 Units, Subcutaneous, TID WC  insulin glargine (LANTUS) injection pen 15 Units, 15 Units, Subcutaneous, Nightly  insulin lispro (HUMALOG) injection pen 0-18 Units, 0-18 Units, Subcutaneous, TID WC  insulin lispro (HUMALOG) injection pen 0-9 Units, 0-9 Units, Subcutaneous, Nightly  lisinopril (PRINIVIL;ZESTRIL) tablet 10 mg, 10 mg, Oral, Daily  hydrALAZINE (APRESOLINE) injection 10 mg, 10 mg, Intravenous, Q6H PRN    Allergies  Allergies   Allergen Reactions    Iv Dye [Iodides] Other (See Comments)     Heartburn, chest pain, shakes, vertigo    Codeine     Pcn [Penicillins]        Physical Exam  VITALS:  BP (!) 147/75   Pulse 90   Temp 97.7 °F (36.5 °C) (Oral)   Resp 16   Ht 5' 5\" (1.651 m)   Wt 147 lb 4.3 oz (66.8 kg)   SpO2 99%   BMI 24.51 kg/m²   TEMPERATURE:  Current - Temp: 97.7 °F (36.5 °C); Max - Temp  Av °F (36.7 °C)  Min: 97.6 °F (36.4 °C)  Max: 98.3 °F (36.8 °C)  PULSE OXIMETRY RANGE: SpO2  Av %  Min: 96 %  Max: 100 %  24HR INTAKE/OUTPUT:      Intake/Output Summary (Last 24 hours) at 2019 1130  Last data filed at 2019 0825  Gross per 24 hour   Intake 680 ml   Output --   Net 680 ml       Physical Exam   Constitutional: She is oriented to person, place, and time. No distress. HENT:   Mouth/Throat: No oropharyngeal exudate. Eyes: No scleral icterus. Cardiovascular: Normal rate, regular rhythm and normal heart sounds. Pulmonary/Chest: Effort normal and breath sounds normal.   Abdominal: Soft. Bowel sounds are normal.   Musculoskeletal: She exhibits no edema. Lymphadenopathy:     She has no cervical adenopathy. Neurological: She is alert and oriented to person, place, and time. Skin: Skin is warm and dry. No rash noted. Labs  Recent Labs     19  0558   WBC 6.9 8.5   HGB 10.7* 11.1*   HCT 32.9* 34.5*    169   MCV 86.9 87.4       Recent Labs     19  0558   * 136   K 3.6 4.3   CL 93* 99   CO2 22 23   BUN 26* 23*   CREATININE 1.3* 0.9       Recent Labs     19  1817 19  0558   AST 22 26   ALT 11 12   BILITOT 0.6 0.6   ALKPHOS 106 108       No results for input(s): MG in the last 72 hours.     Radiology  Xr Chest Standard (2 Vw)    Result Date: 6/3/2019  EXAMINATION: TWO XRAY VIEWS OF THE CHEST 6/2/2019 5:03 pm COMPARISON: None. HISTORY: ORDERING SYSTEM PROVIDED HISTORY: chest pain TECHNOLOGIST PROVIDED HISTORY: Reason for exam:->chest pain Ordering Physician Provided Reason for Exam: Chest Pain (since yesterday. in cancer trial. also reports \"heaviness\" in right leg. ) Acuity: Chronic Type of Exam: Initial FINDINGS: Cardiac and mediastinal contours are within normal limits. Right upper extremity central venous catheter terminates over the cavoatrial junction. There is a moderate-sized pulmonary opacity within the right mid to lower lung. No significant pleural effusion. No evidence of pneumothorax. Mild compression deformity of the upper thoracic vertebral body appears chronic with mild sclerosis. 1. Moderate to large right lower and mid lung pulmonary opacity. Findings may represent atelectasis, pneumonia, and/or malignancy. Recommend correlation with previous available imaging, given reported history of cancer. 2. Mild vertebral compression deformity of upper thoracic vertebral body appears chronic. There is sclerosis associated with this vertebral body. Osseous metastases are not excluded. Correlate with previous available imaging. 3. No evidence of significant pleural effusion. No evidence of pneumothorax. Xr Femur Right (min 2 Views)    Result Date: 6/3/2019  EXAMINATION: 2 XRAY VIEWS OF THE RIGHT FEMUR 6/2/2019 9:41 pm COMPARISON: None. HISTORY: ORDERING SYSTEM PROVIDED HISTORY: pain, h/o leiomyosarcoma TECHNOLOGIST PROVIDED HISTORY: Reason for exam:->pain, h/o leiomyosarcoma Ordering Physician Provided Reason for Exam: pain, h/o leiomyosarcoma, pain midshaft femur, no injury Acuity: Acute Type of Exam: Initial FINDINGS: Diffuse osteopenia limits evaluation for nondisplaced fractures. Degenerative changes of right hip and right knee. No evidence of acute osseous abnormalities.   No osseous the right minor fissure within both the anterior right upper lobe as well as the right middle lobe. These multifocal nodules/masses are highly concerning for underlying intra pulmonary metastatic disease. There is mild subpleural opacity within the anterior left upper lobe, which could represent either atelectasis or parenchymal scar. There is minimal dependent atelectasis within the bilateral lower lobes. No additional focus of airspace consolidation is identified. There is no evidence of a pneumothorax or pleural effusion. Upper Abdomen: There are multiple scattered various sized heterogeneous though primarily low-attenuation mass lesions scattered throughout the liver parenchyma consistent with intrahepatic metastatic disease. The largest of these measures approximately 11.3 x 6.2 cm within the inferior right hepatic lobe. The patient is status post cholecystectomy. There is a 1.2 cm metastatic deposit within the right perirenal space. There are multiple nicola metastases within the upper abdomen, the largest being a partially visualized necrotic mass measuring approximately 6.5 x 5.4 cm within the left upper quadrant. Visualized portions of the adrenal glands are unremarkable. Soft Tissues/Bones: There is an approximate 1.9 x 1.9 cm metastatic deposit within the subcutaneous soft tissues of the upper left chest just anterior to the pectoral muscle. There is also a 3.7 x 3.7 cm heterogeneous and partially necrotic metastatic nodule within the left supraclavicular space. There is an approximate 1.2 cm metastatic deposit within the right breast soft tissues. There is a 1.8 cm metastatic deposit within the soft tissues of the right lateral chest wall. There is no osteolytic lesion within the T3 vertebral body with associated pathologic fracture cortical breakthrough and extension into the paraspinal soft tissues.   There is a mixed osteolytic and osteoblastic lesion within T9 without definite pathologic spinal canal stenosis. There is no significant neural foraminal narrowing. L4-L5: There is a disc bulge and facet arthropathy. There is moderate spinal canal stenosis. No significant neural foraminal narrowing is present. L5-S1: There is a disc bulge and facet arthropathy. There is moderate left neural foraminal narrowing. No spinal canal stenosis or right neural foraminal narrowing is present. 1. Osseous metastatic disease at T3, T9 and S2. 2. Severe spinal canal stenosis at T8-9 and T11-12 secondary to 4 mm left paracentral disc protrusions at both of these levels. There is mild increased T2 signal intensity within the thoracic spinal cord suggesting compressive edema at these levels. 3. Extraosseous extension of tumor at T3 extending into the prevertebral space and ventral epidural space. There is resulting mild spinal canal stenosis. 4. Intramuscular metastatic disease within the right paraspinal musculature at T4-5 as well as the right psoas muscle. 5. Redemonstration of diffuse hepatic metastatic disease, incompletely imaged. 6. Multilevel degenerative changes of the lumbar spine most pronounced at L4-5 with a disc bulge and facet arthropathy results in moderate spinal canal stenosis. The findings were sent to the Radiology Results [id number] at 1:42 pm on 6/3/2019to be communicated to a licensed caregiver. Mri Lumbar Spine Wo Contrast    Result Date: 6/3/2019  EXAMINATION: MRI OF THE LUMBAR SPINE WITHOUT CONTRAST; MRI OF THE THORACIC SPINE WITHOUT CONTRAST, 6/3/2019 12:12 pm TECHNIQUE: Multiplanar multisequence MRI of the lumbar spine was performed without the administration of intravenous contrast.; Multiplanar multisequence MRI of the thoracic spine was performed without the administration of intravenous contrast. COMPARISON: CT chest 06/03/2019.  HISTORY: ORDERING SYSTEM PROVIDED HISTORY: spinal cord compression from cancer suspected, right leg weakness TECHNOLOGIST PROVIDED HISTORY: Ordering Physician Provided Reason for Exam: 75 y.o. female with PMH of HTN, DM, metastatic leiomyosarcoma who presented to [facility] for chest, back and leg pain all on the right side. She was treated with pain medication, cardiac work up and PE work up neg. Acuity: Acute Type of Exam: Initial FINDINGS: MRI thoracic: There is diffuse abnormal bone marrow signal intensity with decreased T1 signal intensity and heterogeneous increased T2 signal intensity within the T3 and T9 vertebral bodies. There is extraosseous extension of disease into the prevertebral space at T3 as well as into the ventral epidural space at T3. There is mild spinal canal stenosis at the T3 level. There is a soft tissue mass within the right paraspinal musculature centered at the T4-5 level. This mass measures 2.0 x 3.7 x 4.9 cm in AP by transverse by craniocaudal dimension. There is severe spinal canal stenosis at T8-9 secondary to a focal 4 mm left paracentral disc protrusion. This flattens the ventral surface of the spinal cord. There is mild abnormal increased T2 signal intensity within the thoracic spinal cord at this level. There is severe spinal canal stenosis at T11-12 secondary to a focal 4 mm left paracentral disc protrusion. Subtle increased T2 signal intensity is noted within the thoracic spinal cord at this level. There is moderate spinal canal stenosis at T5-6 secondary to a focal 3 mm central disc protrusion. Diffuse hepatic metastatic disease is noted, incompletely imaged. MRI lumbar: There is normal alignment of the lumbar spine. There is no acute fracture or listhesis. There is a 1 cm focus of bone marrow signal abnormality within the S2 segment consistent with a focus of osseous metastatic disease. Intervertebral disc heights are maintained. There is an intramuscular metastatic lesion within the right psoas muscle measuring 1.1 x 1.7 cm. L1-L2: There is no disc bulge or protrusion present.   There is no significant spinal canal stenosis or neural foraminal narrowing present. L2-L3: There is no disc bulge or protrusion present. There is no significant spinal canal stenosis or neural foraminal narrowing present. L3-L4: There is a disc bulge, facet arthropathy and thickening of the ligamentum flavum resulting in mild spinal canal stenosis. There is no significant neural foraminal narrowing. L4-L5: There is a disc bulge and facet arthropathy. There is moderate spinal canal stenosis. No significant neural foraminal narrowing is present. L5-S1: There is a disc bulge and facet arthropathy. There is moderate left neural foraminal narrowing. No spinal canal stenosis or right neural foraminal narrowing is present. 1. Osseous metastatic disease at T3, T9 and S2. 2. Severe spinal canal stenosis at T8-9 and T11-12 secondary to 4 mm left paracentral disc protrusions at both of these levels. There is mild increased T2 signal intensity within the thoracic spinal cord suggesting compressive edema at these levels. 3. Extraosseous extension of tumor at T3 extending into the prevertebral space and ventral epidural space. There is resulting mild spinal canal stenosis. 4. Intramuscular metastatic disease within the right paraspinal musculature at T4-5 as well as the right psoas muscle. 5. Redemonstration of diffuse hepatic metastatic disease, incompletely imaged. 6. Multilevel degenerative changes of the lumbar spine most pronounced at L4-5 with a disc bulge and facet arthropathy results in moderate spinal canal stenosis. The findings were sent to the Radiology Results [id number] at 1:42 pm on 6/3/2019to be communicated to a licensed caregiver. Mri Pelvis W Wo Contrast    Result Date: 6/5/2019  MRI pelvis without with IV contrast HISTORY: Metastatic leiomyosarcoma with back pain and leg pain on the right; right inguinal mass Pre and postcontrast imaging with 14 mL ProHance.  Interposed between the right greater trochanter and overlying sartorius muscle  is a soft tissue mass measuring 6 x 3.7 x 4.8 cm consistent with metastasis. The lesion demonstrates peripheral enhancement with low intensity central component likely related to necrosis. Incompletely imaged are 2 additional soft tissue metastases, one of which resides posterior to the upper thigh muscles measuring 2.2 x 3.2 cm and another on the left, located posteromedial to the thigh muscles measuring 2.6 x 2.5 cm. Tiny lesion anteromedially in the upper left thigh is situated within the subcutaneous fat measuring 1 cm. Additional tiny lesion is located within the subcutaneous fat posterior to the left proximal femur series 7 image 60. All 4 lesions show enhancement  Recently described intramuscular metastasis within the right psoas muscle measures 13 mm. Along the left pelvic sidewall, enhancing metastasis measures 5 x 4.4 cm. Situated posterior to the left is yet another metastatic lesion measuring 3.5 cm in greatest dimension. A few tiny enhancing metastases are located within the left gluteus medias and rochelle muscles, series 7 images 21 and 26, respectively     Numerous metastatic lesions as described.     Vl Extremity Venous Right    Result Date: 6/3/2019  Vascular Lower Extremities DVT Study Procedure -- PRELIMINARY SONOGRAPHER REPORT --   Demographics   [facility]   Date of Study     06/03/2019          Gender              Female   Patient Number    [id number]          Date of Birth       1955   Visit Number      [id number]           Age                 75 year(s)   Accession Number  [id number]           Room Number         1406   Corporate ID      [id number]            Jagruti Gonzalez RVT                                                            CCT   Ordering          Renate Langston MD  Interpreting        Alina Cantu  Physician                             Physician           MD  Procedure Type of Study: Veins:Lower Extremities DVT Study, VL EXTREMITY VENOUS DUPLEX RIGHT. Tech Comments Right No evidence of deep vein or superficial vein thrombosis involving the right lower extremity and the left common femoral vein. Incidental finding on the right: posterior (proximal) calf, echogenic vascularized mass measuring 6.1 cm by 4.1 cm. Pathology  No new path    Problem List  Patient Active Problem List   Diagnosis    [facility]    Encounter for consultation    Secondary malignant neoplasm of muscle of hip ([address]    Secondary malignant neoplasm of liver ([address]    Chemotherapy induced nausea and vomiting    Chemotherapy induced diarrhea    Chemotherapy induced neutropenia ([address]    Type 2 diabetes mellitus with renal complication (HCC)    Hypothyroidism, adult    Bilateral shoulder pain    Fatty liver    Essential hypertension    Hyperlipidemia LDL goal <70    Carpal tunnel syndrome, bilateral    Acute right flank pain    Hordeolum externum of left upper eyelid    Acute bilateral thoracic back pain    Chest pain    Spinal cord compression from spine mets       Assessment and Plan:     Recurrent stage iv leiomyosarcoma Heavily pretreated currently on a clinical trial at . She is holding study drug until follow-up with Dr Christina Abdi at [address] [place] [address] onset right leg weakness and pain noted to have bulky disease on her right hip with a protruding cutaneous lesion as well. Also noted on her MRI having lesions in T3 T9 and S1 and also extensive disks protrusion at 8 9. Actually reviewed the films with Dr. Tammie Recinos and Yamilet Mina of neurosurgery.     Leg pain likely related to her locally advanced tumor on the right hip less likely from the disease seen in her spinal column. There is no cord compression except for the herniated disks.   Neurosurgery did discuss decompression laminectomy with the patient and given the extent of surgery and also comorbid tumors in her vertebral bodies likely would not tolerate

## 2019-06-05 NOTE — PROGRESS NOTES
Patient A/Ox4. VSS throughout shift. Blood pressures and blood sugars better controlled today. Patient has good appetite and is tolerating food and fluids. Patient is voiding and having adequate bowel movements. Patient states pain is controlled with current ordered medications. Patient showered today and has been up moving in room several times. Call light is in reach, fall precautions in place. Will continue to monitor.

## 2019-06-05 NOTE — PROGRESS NOTES
Neurosurgery Progress Note    I saw and examined Lupillo Coy on 06/05/19. No acute events overnight. Patient underwent radiation to T3 vertebral body metastatic lesion today. Neurologically stable on exam. Ambulating in room with walker. A/P: 75year old female with recurrent leiomyosarcoma who presents with RLE weakness and pain likely secondary to right leg mass.     -No neurosurgical intervention at this time. Patient not interested in surgery for thoracic disc herniations at this time.  -Frequent neuro checks  -Decadron, PPI/SSI while on steroids  -Will follow peripherally while in house. Please call with questions.      Maynor Akbar MD, PhD  [address], [address] [place], [place], [address] [phone] (c), [phone] (o)

## 2019-06-05 NOTE — PLAN OF CARE
Problem: Pain:  Goal: Control of acute pain  Outcome: Ongoing  Note:   Continuing to monitor patients pain and administer pain medication as request or scheduled. Patient is satisfied. Will continue to monitor. Problem: Falls - Risk of:  Goal: Will remain free from falls  Outcome: Ongoing  Note:   Pt has remained free of falls throughout shift. Pt has all fall precautions in place: bed in lowest position with alarm on, nonskid footwear on pt, fall signs posted, and call light within reach. Pt instructed to call out if in need of assistance. Will continue to monitor.

## 2019-06-05 NOTE — ONCOLOGY
Jovi     [phone]    DATE: 6-5-19   AREA TREATED: TSPINE    DAILY DOSE: 300cGy    CUMULATIVE DOSE:300 cGy    #  1  OUT OF 15 TREATMENTS    NEXT PLANNED TREATMENT  DATE: 6-6-19    Daily Treatments are Monday through Friday:       INPATIENT CODING:  Beam Radiation    Photons   Photon energy :  >10mv

## 2019-06-05 NOTE — PROGRESS NOTES
Pt alert and oriented x4, VSS, IV ns locked in right arm. Neuro checks are WDL. Pt ambulated to bathroom with walker and standby assist. Pt tolerated it well and showed a steady gait. Bed alarm on, bedside table and call light in reach.

## 2019-06-05 NOTE — CONSULTS
NEUROSURGERY [facility]  [id number]   1955   2019    Requesting physician: Aaron Salcedo MD    Reason for consultation: Right leg weakness and pain    History of present illness: Patient is a 75 y.o. female w/ PMH of leiomyosarcoma who presented on 2019 with right leg weakness and pain. Patient endorses pain starting in right hip going down the anterior aspect of the right leg to foot. Patient also reports weakness to the leg, in the thigh more so than the calf and foot. Patient states the pain and weakness started Saturday and has been getting progressively worse. Patient reports a palpable tumor present to the right hip/pelvic area. Patient reports the area is painful. Patient denies numbness or tingling to the BLE. Patient denies pain or weakness to LLE. Denies bowel or bladder complaints. ROS:   GENERAL:  Denies fever or recent illness.  Denies weight changes   EYES:  Denies vision change or diplopia  EARS:  Denies hearing loss  CARDIAC:  Denies chest pain  RESPIRATORY:  Denies shortness of breath  SKIN:  Denies rash or lesions   HEM:  Denies excessive bruising  PSYCH:  Denies anxiety or depression  NEURO:  Denies headache, numbness or tingling or lateralizing weakness   :  Denies urinary difficulty  GI: Denies nausea, vomiting, diarrhea or constipation  MUSCULOSKELETAL:  No arthralgias    Allergies   Allergen Reactions    Iv Dye [Iodides] Other (See Comments)     Heartburn, chest pain, shakes, vertigo    Codeine     Pcn [Penicillins]        Past Medical History:   Diagnosis Date    Arthritis     Cancer ([address]     Diabetes mellitus ([address]     GERD (gastroesophageal reflux disease)     Hyperlipidemia     Hypertension         Past Surgical History:   Procedure Laterality Date     SECTION      CHOLECYSTECTOMY      DILATION AND CURETTAGE OF UTERUS      TUMOR REMOVAL      TUNNELED VENOUS PORT PLACEMENT      Port removed        Social History     Occupational History    Occupation: retired   Tobacco Use    Smoking status: Never Smoker    Smokeless tobacco: Never Used   Substance and Sexual Activity    Alcohol use: No    Drug use: No    Sexual activity: Not on file        Family History   Problem Relation Age of Onset    Diabetes Mother     Hypertension Mother     Cancer Brother         kidney    Cancer Brother         melanoma        Outpatient Medications Marked as Taking for the 6/3/19 encounter [facility] Encounter)   Medication Sig Dispense Refill    oxyCODONE (OXYCONTIN) 20 MG extended release tablet Take 20 mg by mouth every 12 hours.  Niraparib Tosylate 100 MG CAPS Take 2 capsules by mouth nightly      JARDIANCE 25 MG tablet TAKE 1 TABLET BY MOUTH EVERY DAY 30 tablet 4    gabapentin (NEURONTIN) 100 MG capsule Take 100 mg by mouth 2 times daily.       SYNTHROID 125 MCG tablet Take 1 tablet by mouth every morning (before breakfast) 90 tablet 1    LANTUS SOLOSTAR 100 UNIT/ML injection pen INJECT 1O UNITS SUBCUTANEOUSLY AT BEDTIME 15 pen 5    insulin lispro (HUMALOG KWIKPEN) 100 UNIT/ML pen Inject 30 Units into the skin 3 times daily (before meals) (Patient taking differently: Inject into the skin 3 times daily (before meals) Per sliding scale) 5 pen 3    metoprolol succinate (TOPROL XL) 100 MG extended release tablet TAKE 1 TABLET BY MOUTH EVERY DAY 90 tablet 3      Current Facility-Administered Medications   Medication Dose Route Frequency Provider Last Rate Last Dose    fenofibrate tablet 160 mg  160 mg Oral Daily Anat Granger MD   160 mg at 06/04/19 0813    gabapentin (NEURONTIN) capsule 100 mg  100 mg Oral BID Anat Granger MD   100 mg at 06/04/19 2053    empagliflozin (JARDIANCE) tablet TABS 25 mg  25 mg Oral Daily Anat Granger MD   25 mg at 06/04/19 0836    metoprolol succinate (TOPROL XL) extended release tablet 100 mg  100 mg Oral Daily Anat Granger MD   100 mg at 06/04/19 0813    Niraparib Tosylate CAPS 2 capsule  2 capsule Oral Nightly Marylen Amato, MD   2 capsule at 06/04/19 2054    oxyCODONE (OXYCONTIN) extended release tablet 20 mg  20 mg Oral Q12H Marylen Amato, MD   20 mg at 06/04/19 1203    oxyCODONE (ROXICODONE) immediate release tablet 10 mg  10 mg Oral Q6H PRN Marylen Amato, MD   10 mg at 06/04/19 2054    predniSONE (DELTASONE) tablet 10 mg  10 mg Oral Daily Marylen Amato, MD   10 mg at 06/04/19 1956    levothyroxine (SYNTHROID) tablet 125 mcg  125 mcg Oral QAM AC Marylen Amato, MD   125 mcg at 06/04/19 8255    morphine (PF) injection 2 mg  2 mg Intravenous Q4H PRN Marylen Amato, MD   2 mg at 06/04/19 1752    ondansetron (ZOFRAN) injection 4 mg  4 mg Intravenous Q6H PRN Marylen Amato, MD        sodium chloride flush 0.9 % injection 10 mL  10 mL Intravenous 2 times per day Marylen Amato, MD   10 mL at 06/04/19 2001    sodium chloride flush 0.9 % injection 10 mL  10 mL Intravenous PRN Marylen Amato, MD        acetaminophen (TYLENOL) tablet 650 mg  650 mg Oral Q4H PRN Marylen Amato, MD        enoxaparin (LOVENOX) injection 40 mg  40 mg Subcutaneous Daily Marylen Amato, MD   40 mg at 06/04/19 0814    glucose (GLUTOSE) 40 % oral gel 15 g  15 g Oral PRN Marylen Amato, MD        dextrose 50 % IV solution  12.5 g Intravenous PRN Marylen Amato, MD        glucagon (rDNA) injection 1 mg  1 mg Intramuscular PRN Marylen Amato, MD        dextrose 5 % solution  100 mL/hr Intravenous PRN Marylen Amato, MD        pantoprazole (PROTONIX) injection 40 mg  40 mg Intravenous Daily Catherine Bio, APRN - CNP   40 mg at 06/04/19 1212    insulin lispro (HUMALOG) injection pen 10 Units  10 Units Subcutaneous TID WC Jennifer Kyle MD   10 Units at 06/04/19 1212    insulin glargine (LANTUS) injection pen 15 Units  15 Units Subcutaneous Nightly Jennifer Kyle MD   15 Units at 06/04/19 2055    insulin lispro (HUMALOG) injection pen 0-18 Units  0-18 Units Subcutaneous TID HANNAH Kyle MD   15 Units at 06/04/19 1213    insulin lispro (HUMALOG) injection pen 0-9 Units  0-9 Units Subcutaneous Nightly Dominique Beard MD   2 Units at 06/04/19 2055    lisinopril (PRINIVIL;ZESTRIL) tablet 10 mg  10 mg Oral Daily Dominique Beard MD   10 mg at 06/04/19 1212    hydrALAZINE (APRESOLINE) injection 10 mg  10 mg Intravenous Q6H PRN Dominique Beard MD          Objective:  BP (!) 153/70   Pulse 88   Temp 98.1 °F (36.7 °C) (Oral)   Resp 16   Ht 5' 5\" (1.651 m)   Wt 147 lb 4.3 oz (66.8 kg)   SpO2 97%   BMI 24.51 kg/m²     Physical Exam:  Patient seen and examined   General: Well developed. Alert and cooperative in no acute distress. HENT: atraumatic, neck supple  Eyes: Optic discs: Not tested  Pulmonary: unlabored respiratory effort  Cardiovascular:  Warm well perfused. No peripheral edema  Gastrointestinal: abdomen soft, NT, ND    Neurologic Exam:  Neurological:  Mental Status: Awake, alert, oriented x 4, speech clear and appropriate  Attention: Intact  Language: No aphasia or dysarthria noted  Sensation: light touch to RLE decreased on exam  Coordination: Intact    Musculoskeletal:   Gait: Not tested   Assist devices: None   Tone: normal  Motor strength: Motor strength:    Right  Left    Right  Left    Deltoid  5 5  Hip Flex  5 5   Biceps  5 5  Knee Extensors  5 5   Triceps  5 5  Knee Flexors  5 5   Wrist Ext  5 5  Ankle Dorsiflex. 4 5   Wrist Flex  5 5  Ankle Plantarflex. 3 5   Handgrip  5 5  Ext Papito Longus  5 5   Thumb Ext  5 5             Radiological Findings:    I personally reviewed the patient's imaging which consists of an MRI thoracic and lumbar spine dated 6/3/2019. These demonstrate metastases to the T3, T9, and S2 vertebral bodies. At T3, there is a small epidural component that extends down to the right aspect of T4. At T8-9 and T11-12, there are left paracentral disc protrusions, which along with a congenitally narrow canal, result in severe central stenosis. There is no clear T2 signal change at these levels.  Finally, there is intramuscular extension of the tumor at T4-5. Labs  Recent Labs     06/02/19  1817 06/03/19  0558   * 136   CL 93* 99   CO2 22 23   BUN 26* 23*   CREATININE 1.3* 0.9   GLUCOSE 341* 217*     Recent Labs     06/02/19  1817 06/03/19  0558   WBC 6.9 8.5   RBC 3.79* 3.95*       Patient Active Problem List    Diagnosis Date Noted    Spinal cord compression from spine mets 06/03/2019    Chest pain 06/02/2019    Hordeolum externum of left upper eyelid 09/04/2018    Acute bilateral thoracic back pain 09/04/2018    Carpal tunnel syndrome, bilateral 06/23/2018    Acute right flank pain 06/23/2018    Hypothyroidism, adult 05/22/2018    Bilateral shoulder pain 05/22/2018    Fatty liver 05/22/2018    Essential hypertension 05/22/2018    Hyperlipidemia LDL goal <70 05/22/2018    Type 2 diabetes mellitus with renal complication (HCC)     Chemotherapy induced neutropenia ([address] 07/19/2017    Chemotherapy induced nausea and vomiting 06/28/2017    Chemotherapy induced diarrhea 06/28/2017    Secondary malignant neoplasm of muscle of hip ([address] 05/08/2017    Secondary malignant neoplasm of liver ([address] 05/08/2017    Encounter for consultation 04/13/2017    Leiomyosarcoma ([facility] 04/12/2017       Assessment:    Patient is a 75year old female with recurrent leiomyosarcoma who presents with RLE weakness and pain. MRI shows herniated T8-9 and T11-12 with severe central stenosis. Plan:  -No acute neurosurgical intervention. Discussed patient's presentation vis a vis her thoracic central stenosis. Although the lesions are left paracentral, the patient's symptoms are relegated only to the right leg. She denies significant thoracic pain and has no numbness or bowel/bladder dysfunction. Given that correction of the thoracic lesions would require a significant operations entailing multilevel fixation and fusion, Ms. Mana Cavazos elected to defer any surgical intervention at this time.  We discussed the fact that should her thoracic disc herniations play a role in her weakness, she may develop further neurologic deterioration which may be irreversible. -Recommend MRI of pelvis and femur to determine if this contributes to leg weakness.  -Frequent neuro checks  -Steroid therapy for new onset neurologic symptoms. PPI/SSI while on steroids.  -Will follow       Thank you for the consultation.     Radhika Rogers MD, PhD  [address], [address] [place] [address], [id number] [phone] (c), [phone] (o)

## 2019-06-05 NOTE — CARE COORDINATION
2019  [facility])  Clinical Case Management Department    Spoke with patient regarding discharge needs. Pt has no home care needs but asked about getting a wheelchair. No therapy evals noted in pt's chart. Pt would like to know if she could get a wheelchair with a pad on the back since she has lesions in her back. Left a message with Tanner from [address] [place] [address]. Patient: Melissa Martínez  MRN: [id number] / : 1955  ACCT: [de-identified]          Admission Documentation  Attending Provider: August Mcguire MD  Admit date/time: 6/3/2019 11:43 PM  Status: Inpatient [101]  Diagnosis: Spinal cord compression ([address]     Readmission within last 30 days:  no     Living Situation  Discharge Planning  Living Arrangements: Spouse/Significant Other  Support Systems: Spouse/Significant Other, Children, Family Members, Friends/Neighbors  Potential Assistance Needed: N/A  Type of Home Care Services: None  Patient expects to be discharged to[de-identified] Home  Expected Discharge Date: 19    Service Assessment       Values / Beliefs  Do you have any ethnic, cultural, sacramental, or spiritual Buddhist needs you would like us to be aware of while you are in the hospital?: No    Advance Directives (For Healthcare)  Healthcare Directive: Yes, patient has an advance directive for healthcare treatment                        Destination  no needs    [address] [place] [address] [address] with a cushion    Home Health/Skilled [address] [place] [address] Rd at Discharge: None  Home care at home?  No Provider: [facility] Provider Phone: [facility]         Home Medical Care  [facility]   Pharmacy: [facility]  Potential Assistance Purchasing Medications:  No  Does patient want to participate in local refill/meds to beds program?: No    Goals of Care  Patient expects to be discharged to[de-identified] Home           Mode of transport from hospital:pvt car to home    Factors facilitating achievement of predicted outcomes: Family support, Cooperative and Pleasant    Barriers to discharge: none noted     Alli Torres RN  [facility]  Case Management Department  Ph: [phone] Fax: [phone]

## 2019-06-05 NOTE — PLAN OF CARE
Problem: Pain:  Goal: Pain level will decrease  Description  Pain level will decrease  6/4/2019 2230 by Izyz Morrow RN  Outcome: Ongoing   Pt c/o moderate pain in lower back, medicated per mar with prn oxycodone. Pt verbalized relief and is now resting in bed. Will continue to assess pain level. Problem: Falls - Risk of:  Goal: Will remain free from falls  Description  Will remain free from falls  6/4/2019 2230 by Izzy Morrow RN  Outcome: Ongoing   Pt has been free from falls this shift, bed alarm on, bed in lowest position, 2/4 side rails up, nonskid socks on, wheels locked, bedside table and call light in reach. Encouraged pt to call out if needed anything.

## 2019-06-05 NOTE — PROGRESS NOTES
operations entailing multilevel fixation and fusion, Ms. Sharonda Eduardo elected to defer any surgical intervention at this time. We discussed the fact that should her thoracic disc herniations play a role in her weakness, she may develop further neurologic deterioration which may be irreversible.   -the multiple masses in Right thigh could be causing pain and weakness.  -Frequent neuro checks  -decadron  . PPI/SSI while on steroids.  -Will follow   -rad onc following    DISPO-Dispo timing to be determined by primary team once patient is medically stable for discharge.      MAURICIO Arevalo-CNP  Neurosurgery Nurse Practitioner  [phone]    Patient was seen and examined with Dr. Beau Reyes who agrees with above assessment and plan. Electronically signed by:  Wilbert Kunz 6/5/2019 8:57 AM

## 2019-06-05 NOTE — PROGRESS NOTES
Hospitalist Progress Note      PCP: Eric Orona    Date of Admission: 6/3/2019  Hospital day - 2        Medications:  Reviewed    Infusion Medications    dextrose       Scheduled Medications    dexamethasone  4 mg Intravenous Q6H    Followed by   Peter Salt ON 6/7/2019] dexamethasone  4 mg Oral 3 times per day    Followed by   Peter Salt ON 6/9/2019] dexamethasone  4 mg Oral 2 times per day    Followed by   Peter Salt ON 6/12/2019] dexamethasone  4 mg Oral Daily    fenofibrate  160 mg Oral Daily    gabapentin  100 mg Oral BID    empagliflozin  25 mg Oral Daily    metoprolol succinate  100 mg Oral Daily    Niraparib Tosylate  2 capsule Oral Nightly    oxyCODONE  20 mg Oral Q12H    levothyroxine  125 mcg Oral QAM AC    sodium chloride flush  10 mL Intravenous 2 times per day    enoxaparin  40 mg Subcutaneous Daily    pantoprazole  40 mg Intravenous Daily    insulin lispro  10 Units Subcutaneous TID WC    insulin glargine  15 Units Subcutaneous Nightly    insulin lispro  0-18 Units Subcutaneous TID WC    insulin lispro  0-9 Units Subcutaneous Nightly    lisinopril  10 mg Oral Daily     PRN Meds: oxyCODONE HCl, morphine, ondansetron, sodium chloride flush, acetaminophen, glucose, dextrose, glucagon (rDNA), dextrose, hydrALAZINE      Intake/Output Summary (Last 24 hours) at 6/5/2019 1322  Last data filed at 6/5/2019 1241  Gross per 24 hour   Intake 680 ml   Output --   Net 680 ml         Chief Complaint: rt hip pain, diff walking         Subjective: feellittle better, needs more apin control      ROS:  A 12 point review of symptoms is unremarkable with the exception of the chief complaint . Patient specifically denies cp  .     Exam performed by me:    BP (!) 147/75   Pulse 90   Temp 97.7 °F (36.5 °C) (Oral)   Resp 16   Ht 5' 5\" (1.651 m)   Wt 147 lb 4.3 oz (66.8 kg)   SpO2 99%   BMI 24.51 kg/m²       General appearance: comfortable, distress- none   HEENT: Atraumatic, Pupils equal, round, and reactive to light. Extra ocular muscles intact. Neck: Supple, with full range of motion. No jugular venous distention. Respiratory:  Clear to  auscultation , accessory muscle use no, Rales/Ronchi no  Cardiovascular: Regular rate and rhythm with normal S1/S2 ,  Abdomen: Soft, non-tender, non-distended with normal bowel sounds. Musculoskelatal: No clubbing, no edema bilaterally. Dorsalis pedal pulses +   And capillary refills time is  <  than 3 secs. Skin: Skin color, texture, turgor normal.  Neurologic:   Neurovascularly intact grossly . Labs:   Recent Labs     06/02/19 1817 06/03/19  0558   WBC 6.9 8.5   HGB 10.7* 11.1*   HCT 32.9* 34.5*    169     Recent Labs     06/02/19 1817 06/03/19  0558   * 136   K 3.6 4.3   CL 93* 99   CO2 22 23   BUN 26* 23*   CREATININE 1.3* 0.9   CALCIUM 8.9 9.5     Recent Labs     06/02/19 1817 06/03/19  0558   AST 22 26   ALT 11 12   BILITOT 0.6 0.6   ALKPHOS 106 108     No results for input(s): INR in the last 72 hours. Recent Labs     06/02/19 1817 06/02/19 2050   CKTOTAL 24*  --    TROPONINI <0.01 <0.01       No flowsheet data found. Diagnostic Assesment and Plan :   1. Admitted for right hip pain and difficulty walking secondary to recurrent glioma sarcoma, confirmed with MRI of the pelvis. She was on a trial drug from [facility] hospital follows Dr. Benita Ballesteros, no further chemo at this time. Goal is on radiation for pain control and p.o. narcotics for better pain control. Significant amount of weight loss in the last few months, acute protein calorie malnutrition secondary to cancer cachexia. Receiving IV Decadron, OxyContin changed to MS Contin 6.5.19   2. Diabetes mellitus type 2, now insulin-dependent, on insulin and monitor blood sugars. Hypertriglyceridemia on fenofibrate. Hypothyroidism continue Synthroid. Body mass index is 24.51 kg/m².            The following items were considered in medical decision making:  Independent review of images, Review / order clinical lab tests, Review / order radiology, tests Decision to obtain old records. DVT Prophylaxis: lvx   Diet: DIET CARB CONTROL; Dietary Nutrition Supplements: Diabetic Oral Supplement  Code Status: Full Code    PT/OT Eval Status: called and recommend --    This chart was generated in part by using Dragon Dictation system and may contain errors related to that system including errors in grammar, punctuation, and spelling, as well as words and phrases that may be inappropriate. When dictating, effort is made to correct spelling/grammar errors. If there are any questions or concerns please feel free to contact the dictating provider for clarification. I have discussed the above stated plan with the patient  and they verbalized understanding and agreed with the plan. A total time of 15 min were exclusively devoted to discuss plan of care and advanced care planning during this encounter. RN notified about todays plan  bed side. Dispo: will monitor, Discharge in few  days to home . Needs to stay in hospital for rad therapy . Demetrius Luna MD  [phone]

## 2019-06-06 VITALS
RESPIRATION RATE: 18 BRPM | SYSTOLIC BLOOD PRESSURE: 155 MMHG | HEIGHT: 65 IN | BODY MASS INDEX: 24.54 KG/M2 | HEART RATE: 83 BPM | WEIGHT: 147.27 LBS | OXYGEN SATURATION: 98 % | TEMPERATURE: 97.5 F | DIASTOLIC BLOOD PRESSURE: 87 MMHG

## 2019-06-06 LAB
GLUCOSE BLD-MCNC: 185 MG/DL (ref 70–99)
GLUCOSE BLD-MCNC: 210 MG/DL (ref 70–99)
GLUCOSE BLD-MCNC: 224 MG/DL (ref 70–99)
PERFORMED ON: ABNORMAL

## 2019-06-06 PROCEDURE — 97116 GAIT TRAINING THERAPY: CPT

## 2019-06-06 PROCEDURE — 77295 3-D RADIOTHERAPY PLAN: CPT

## 2019-06-06 PROCEDURE — 6370000000 HC RX 637 (ALT 250 FOR IP): Performed by: FAMILY MEDICINE

## 2019-06-06 PROCEDURE — 6370000000 HC RX 637 (ALT 250 FOR IP): Performed by: INTERNAL MEDICINE

## 2019-06-06 PROCEDURE — 97165 OT EVAL LOW COMPLEX 30 MIN: CPT

## 2019-06-06 PROCEDURE — 77334 RADIATION TREATMENT AID(S): CPT

## 2019-06-06 PROCEDURE — 2580000003 HC RX 258: Performed by: FAMILY MEDICINE

## 2019-06-06 PROCEDURE — 6360000002 HC RX W HCPCS: Performed by: INTERNAL MEDICINE

## 2019-06-06 PROCEDURE — 6360000002 HC RX W HCPCS: Performed by: FAMILY MEDICINE

## 2019-06-06 PROCEDURE — 6360000002 HC RX W HCPCS: Performed by: NURSE PRACTITIONER

## 2019-06-06 PROCEDURE — 97530 THERAPEUTIC ACTIVITIES: CPT

## 2019-06-06 PROCEDURE — 77300 RADIATION THERAPY DOSE PLAN: CPT

## 2019-06-06 PROCEDURE — 77014 HC CT TREATMENT PLAN: CPT

## 2019-06-06 PROCEDURE — C9113 INJ PANTOPRAZOLE SODIUM, VIA: HCPCS | Performed by: NURSE PRACTITIONER

## 2019-06-06 PROCEDURE — 97535 SELF CARE MNGMENT TRAINING: CPT

## 2019-06-06 PROCEDURE — 77412 RADIATION TX DELIVERY LVL 3: CPT

## 2019-06-06 PROCEDURE — 97161 PT EVAL LOW COMPLEX 20 MIN: CPT

## 2019-06-06 RX ORDER — NALOXONE HYDROCHLORIDE 4 MG/.1ML
1 SPRAY NASAL PRN
Qty: 1 EACH | Refills: 5 | Status: SHIPPED | OUTPATIENT
Start: 2019-06-06

## 2019-06-06 RX ORDER — NALOXONE HYDROCHLORIDE 4 MG/.1ML
1 SPRAY NASAL PRN
Qty: 1 EACH | Refills: 5 | Status: SHIPPED | OUTPATIENT
Start: 2019-06-06 | End: 2019-10-10

## 2019-06-06 RX ORDER — OXYCODONE HYDROCHLORIDE 30 MG/1
30 TABLET ORAL 2 TIMES DAILY PRN
Qty: 20 TABLET | Refills: 0 | Status: SHIPPED | OUTPATIENT
Start: 2019-06-06 | End: 2019-06-16

## 2019-06-06 RX ORDER — NALOXONE HYDROCHLORIDE 4 MG/.1ML
1 SPRAY NASAL PRN
Qty: 1 EACH | Refills: 3 | Status: SHIPPED | OUTPATIENT
Start: 2019-06-06

## 2019-06-06 RX ORDER — HEPARIN SODIUM (PORCINE) LOCK FLUSH IV SOLN 100 UNIT/ML 100 UNIT/ML
500 SOLUTION INTRAVENOUS ONCE
Status: COMPLETED | OUTPATIENT
Start: 2019-06-06 | End: 2019-06-06

## 2019-06-06 RX ORDER — PANTOPRAZOLE SODIUM 40 MG/1
40 TABLET, DELAYED RELEASE ORAL
Qty: 30 TABLET | Refills: 3 | Status: SHIPPED | OUTPATIENT
Start: 2019-06-07 | End: 2019-10-10 | Stop reason: ALTCHOICE

## 2019-06-06 RX ORDER — DEXAMETHASONE 4 MG/1
4 TABLET ORAL DAILY
Qty: 2 TABLET | Refills: 0 | Status: SHIPPED | OUTPATIENT
Start: 2019-06-12 | End: 2019-06-14

## 2019-06-06 RX ORDER — DEXAMETHASONE 4 MG/1
4 TABLET ORAL EVERY 12 HOURS SCHEDULED
Qty: 4 TABLET | Refills: 0 | Status: SHIPPED | OUTPATIENT
Start: 2019-06-09 | End: 2019-06-11

## 2019-06-06 RX ORDER — DEXAMETHASONE 4 MG/1
4 TABLET ORAL EVERY 8 HOURS SCHEDULED
Qty: 6 TABLET | Refills: 0 | Status: SHIPPED | OUTPATIENT
Start: 2019-06-07 | End: 2019-06-09

## 2019-06-06 RX ORDER — PANTOPRAZOLE SODIUM 40 MG/1
40 TABLET, DELAYED RELEASE ORAL
Status: DISCONTINUED | OUTPATIENT
Start: 2019-06-07 | End: 2019-06-06 | Stop reason: HOSPADM

## 2019-06-06 RX ORDER — LISINOPRIL 10 MG/1
10 TABLET ORAL DAILY
Qty: 30 TABLET | Refills: 3 | Status: SHIPPED | OUTPATIENT
Start: 2019-06-07 | End: 2019-09-23 | Stop reason: SDUPTHER

## 2019-06-06 RX ADMIN — ENOXAPARIN SODIUM 40 MG: 40 INJECTION SUBCUTANEOUS at 09:09

## 2019-06-06 RX ADMIN — DEXAMETHASONE SODIUM PHOSPHATE 4 MG: 4 INJECTION, SOLUTION INTRAMUSCULAR; INTRAVENOUS at 04:50

## 2019-06-06 RX ADMIN — METOPROLOL SUCCINATE 100 MG: 100 TABLET, EXTENDED RELEASE ORAL at 09:07

## 2019-06-06 RX ADMIN — DOCUSATE SODIUM 100 MG: 100 CAPSULE, LIQUID FILLED ORAL at 09:07

## 2019-06-06 RX ADMIN — OXYCODONE HYDROCHLORIDE 10 MG: 5 TABLET ORAL at 15:27

## 2019-06-06 RX ADMIN — INSULIN LISPRO 10 UNITS: 100 INJECTION, SOLUTION INTRAVENOUS; SUBCUTANEOUS at 13:02

## 2019-06-06 RX ADMIN — LEVOTHYROXINE SODIUM 125 MCG: 0.12 TABLET ORAL at 07:35

## 2019-06-06 RX ADMIN — OXYCODONE HYDROCHLORIDE 10 MG: 5 TABLET ORAL at 09:11

## 2019-06-06 RX ADMIN — OXYCODONE HYDROCHLORIDE 30 MG: 20 TABLET, FILM COATED, EXTENDED RELEASE ORAL at 10:26

## 2019-06-06 RX ADMIN — INSULIN LISPRO 10 UNITS: 100 INJECTION, SOLUTION INTRAVENOUS; SUBCUTANEOUS at 09:17

## 2019-06-06 RX ADMIN — INSULIN LISPRO 3 UNITS: 100 INJECTION, SOLUTION INTRAVENOUS; SUBCUTANEOUS at 16:38

## 2019-06-06 RX ADMIN — Medication 10 ML: at 09:24

## 2019-06-06 RX ADMIN — HEPARIN SODIUM (PORCINE) LOCK FLUSH IV SOLN 100 UNIT/ML 500 UNITS: 100 SOLUTION at 18:10

## 2019-06-06 RX ADMIN — DEXAMETHASONE SODIUM PHOSPHATE 4 MG: 4 INJECTION, SOLUTION INTRAMUSCULAR; INTRAVENOUS at 10:26

## 2019-06-06 RX ADMIN — INSULIN LISPRO 6 UNITS: 100 INJECTION, SOLUTION INTRAVENOUS; SUBCUTANEOUS at 09:23

## 2019-06-06 RX ADMIN — GABAPENTIN 100 MG: 100 CAPSULE ORAL at 09:07

## 2019-06-06 RX ADMIN — INSULIN LISPRO 6 UNITS: 100 INJECTION, SOLUTION INTRAVENOUS; SUBCUTANEOUS at 13:04

## 2019-06-06 RX ADMIN — PANTOPRAZOLE SODIUM 40 MG: 40 INJECTION, POWDER, FOR SOLUTION INTRAVENOUS at 09:07

## 2019-06-06 RX ADMIN — LISINOPRIL 10 MG: 10 TABLET ORAL at 09:07

## 2019-06-06 RX ADMIN — DEXAMETHASONE SODIUM PHOSPHATE 4 MG: 4 INJECTION, SOLUTION INTRAMUSCULAR; INTRAVENOUS at 16:37

## 2019-06-06 RX ADMIN — FENOFIBRATE 160 MG: 160 TABLET ORAL at 09:07

## 2019-06-06 ASSESSMENT — PAIN DESCRIPTION - LOCATION
LOCATION: HIP
LOCATION: HIP;BACK

## 2019-06-06 ASSESSMENT — ENCOUNTER SYMPTOMS
RESPIRATORY NEGATIVE: 1
EYES NEGATIVE: 1
ALLERGIC/IMMUNOLOGIC NEGATIVE: 1
BACK PAIN: 1
GASTROINTESTINAL NEGATIVE: 1

## 2019-06-06 ASSESSMENT — PAIN DESCRIPTION - ONSET
ONSET: ON-GOING
ONSET: ON-GOING

## 2019-06-06 ASSESSMENT — PAIN DESCRIPTION - FREQUENCY
FREQUENCY: CONTINUOUS
FREQUENCY: CONTINUOUS

## 2019-06-06 ASSESSMENT — PAIN DESCRIPTION - PAIN TYPE
TYPE: ACUTE PAIN
TYPE: ACUTE PAIN

## 2019-06-06 ASSESSMENT — PAIN DESCRIPTION - PROGRESSION
CLINICAL_PROGRESSION: GRADUALLY WORSENING
CLINICAL_PROGRESSION: GRADUALLY WORSENING

## 2019-06-06 ASSESSMENT — PAIN SCALES - GENERAL
PAINLEVEL_OUTOF10: 7
PAINLEVEL_OUTOF10: 7
PAINLEVEL_OUTOF10: 4
PAINLEVEL_OUTOF10: 0
PAINLEVEL_OUTOF10: 4
PAINLEVEL_OUTOF10: 4

## 2019-06-06 ASSESSMENT — PAIN DESCRIPTION - ORIENTATION
ORIENTATION: RIGHT
ORIENTATION: RIGHT

## 2019-06-06 ASSESSMENT — PAIN DESCRIPTION - DESCRIPTORS
DESCRIPTORS: ACHING
DESCRIPTORS: ACHING

## 2019-06-06 NOTE — PLAN OF CARE
Problem: Pain:  Goal: Pain level will decrease  Description  Pain level will decrease  6/6/2019 1415 by Stanford Turner RN  Outcome: Ongoing  Note:   Patient complains of pain to right hip and low back. PRN and scheduled pain medication administered per request and doctors orders. Patient nervous about going home with pain, Dr. Steve Ha notified of this and pain medication to be prescribed before leaving. Will continue to monitor. 6/6/2019 0552 by Abhinav Ashley RN  Outcome: Ongoing  Note:   Patient's pain will continue to improve and be managed per the [facility] Problem: Falls - Risk of:  Goal: Will remain free from falls  Description  Will remain free from falls  6/6/2019 1415 by Stanford Turner RN  Outcome: Ongoing  Note:   Patient high fall risk and is up with assist x1 with the walker. Patient alert and oriented x4, non skid socks on, bed in lowest position and locked, side rails up x2, call light and belongings within reach, bed alarm on for safety, fall sign posted. Will continue to monitor. 6/6/2019 0552 by Abhinav Ashley RN  Outcome: Ongoing  Note:   Patient will remain free from falls.

## 2019-06-06 NOTE — CARE COORDINATION
Manual wheelchairs - With ALL WHEELCHAIRS, the first rule to remember is that the need is for IN THE HOME, and that you must rule out each lower-level items of Canes and Walkers before a higher level is covered. a. Standard - Rule out cane and walker; does not have to be able  to self-propel, but needs manual wheelchair for use within the  home. b. Pollo-height - Needs manual wheelchair; needs lower seat to  floor height for transfers and/or to assist with self-propelling with  feet. c. Lightweight - Rule out cane/walker and standard weight manual  wheelchair. MUST be independent in self-propelling with the  lightweight wheelchair (cannot be needed solely for caregiver  convenience). d. High strength lightweight - Rule out standard, pollo-height and  lightweight. Needs a seat width/seat depth/[id number] height  not available in ANY lower level base and/or patient is up in chair  greater than two hours per day and highly active. Does not have  to be self-propeller. Needs could relate to activity level or size of  patient (i.e., extremely tall or very short and requires ultra-pollo  seat height). e. Ultra lightweight - As of March 1, 2013, requires ATP and PT/  OT evaluation as well as face-to-face exam by physician and must  have past history of use of same type base and activity both inside  and outside the home. Patient must be a full-time independent  manual wheelchair user and must require individualized fitting  and adjustments such as, but not limited to, axle configuration,  wheel chamber or seat and back angles that are not available on  a lower-level wheelchair. Need to be very specific as to what  is needed on this base that is NOT available on a high-strength  lightweight base (). f. Heavy-duty base -is covered if patient needs a manual wheelchair  and weight is greater than 250 pounds. g.  Extra heavy duty-is covered if patient needs a manual wheelchair  and weight is greater than 300

## 2019-06-06 NOTE — DISCHARGE SUMMARY
Hospital Medicine Discharge Summary    Patient ID: Colton Tilley      Patient's PCP: Mariann Berry    Admit Date: 6/3/2019     Discharge Date:   6.6.19    Admitting Physician: Monie Kruse MD     Discharge Physician: Portillo Juarez. 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 Chief Complaint: rt hip pain, rt leg pain     Subjective: betterto day      ROS: as noted above and  All other systems reviewed and negative. Exam performed by me:    BP (!) 155/87   Pulse 83   Temp 97.5 °F (36.4 °C) (Oral)   Resp 18   Ht 5' 5\" (1.651 m)   Wt 147 lb 4.3 oz (66.8 kg)   SpO2 98%   BMI 24.51 kg/m²     General appearance: No apparent distress, appears stated age   HEENT: Pupils equal, round, and reactive to light. Neck: Supple, with full range of motion. No jugular venous distention   Respiratory:  Normal respiratory effort. Clear to auscultation   Cardiovascular: Regular rate and rhythm with normal S1/S2    Abdomen: Soft, non-tender, non-distended with normal bowel sounds. Musculoskelatal: No clubbing, cyanosis or edema bilaterally. Skin: Skin color, texture, turgor normal.  No rashes or lesions. Neurologic:  Neurovascularly intact   grossly non-focal.  Psychiatric: Alert and oriented x3. Labs:   No results for input(s): WBC, HGB, HCT, PLT in the last 72 hours. No results for input(s): NA, K, CL, CO2, BUN, CREATININE, CALCIUM, PHOS in the last 72 hours. Invalid input(s): MAGNES  No results for input(s): AST, ALT, BILIDIR, BILITOT, ALKPHOS in the last 72 hours.   No results for input(s): INR in the last 72 hours. No results for input(s): Jeny Wakefield in the last 72 hours. Hospital course    Active Hospital Problems    Diagnosis Date Noted    Spinal cord compression from spine mets [G95.20] 06/03/2019    Essential hypertension [I10] 05/22/2018    Type 2 diabetes mellitus with renal complication (HCC) [P96.73]     Leiomyosarcoma ([address] [C49.9] 04/12/2017   1. Admitted for right hip pain and difficulty walking secondary to recurrent liomyosarcoma, confirmed with MRI of the pelvis. She was on a trial drug from [facility] hospital follows Dr. Aisha Duane, no further chemo at this time. Goal is on radiation for pain control and p.o. narcotics for better pain control. S/p 1/10, further rx here   Significant amount of weight loss in the last few months, acute protein calorie malnutrition secondary to cancer cachexia. Receiving IV Decadron, OxyContin changed to MS Contin 6.5.19   Po decadron at dc   2. Diabetes mellitus type 2, now insulin-dependent, on insulin and monitor blood sugars. Hypertriglyceridemia on fenofibrate. Hypothyroidism continue Synthroid.     Body mass index is 24.51 kg/m².                  The following items were considered in medical decision making:  Independent review of images, Review / order clinical lab tests, Review / order radiology, tests Decision to obtain old records. Discharge Condition -  Hemodynamically Stable. Consults:     IP CONSULT TO NEUROSURGERY  IP CONSULT TO ONCOLOGY    Disposition:  Home    Patient was advised to seek immediate medical attention if his /her sx worsen, by calling pcp or  911  Discharge Instructions/Follow-up:     Dr. Toi Chang in the next 1-2 weeks. Specialist rad onco    Referral given dr. Magali Wade     For medical questions after discharge, contact the [facility] physicians  Answering Service at [phone] to have the physician on call paged. For medication questions, contact the pharmacist on call at [phone].   Your discharging physician is Dr. Gabriella Beavers. Ambar Arm and your admitting physician was Dr. Nadine Orellana MD.      Code Status:  Full Code     Activity: as joanna      Diet: regular diet    Discharge Medications:     Current Discharge Medication List           Details   !! oxyCODONE (OXY-IR) 30 MG immediate release tablet Take 1 tablet by mouth 2 times daily as needed for Pain for up to 10 days. Qty: 20 tablet, Refills: 0    Comments: Reduce doses taken as pain becomes manageable  Associated Diagnoses: Leiomyosarcoma ([address]      ! ! naloxone (NARCAN) 4 MG/0.1ML LIQD nasal spray 1 spray by Nasal route as needed for Opioid Reversal  Qty: 1 each, Refills: 3    Associated Diagnoses: Leiomyosarcoma (HCC)      lisinopril (PRINIVIL;ZESTRIL) 10 MG tablet Take 1 tablet by mouth daily  Qty: 30 tablet, Refills: 3      !! dexamethasone (DECADRON) 4 MG tablet Take 1 tablet by mouth every 8 hours for 2 days  Qty: 6 tablet, Refills: 0      !! dexamethasone (DECADRON) 4 MG tablet Take 1 tablet by mouth every 12 hours for 2 days  Qty: 4 tablet, Refills: 0      !! dexamethasone (DECADRON) 4 MG tablet Take 1 tablet by mouth daily for 2 days  Qty: 2 tablet, Refills: 0      naloxegol (MOVANTIK) 25 MG TABS tablet Take 1 tablet by mouth every morning  Qty: 30 tablet, Refills: 0      pantoprazole (PROTONIX) 40 MG tablet Take 1 tablet by mouth every morning (before breakfast)  Qty: 30 tablet, Refills: 3      !! naloxone 4 MG/0.1ML LIQD nasal spray 1 spray by Nasal route as needed for Opioid Reversal  Qty: 1 each, Refills: 5      !! naloxone 4 MG/0.1ML LIQD nasal spray 1 spray by Nasal route as needed for Opioid Reversal  Qty: 1 each, Refills: 5       !! - Potential duplicate medications found. Please discuss with provider. Details   Niraparib Tosylate 100 MG CAPS Take 2 capsules by mouth nightly      JARDIANCE 25 MG tablet TAKE 1 TABLET BY MOUTH EVERY DAY  Qty: 30 tablet, Refills: 4      gabapentin (NEURONTIN) 100 MG capsule Take 100 mg by mouth 2 times daily. LANTUS SOLOSTAR 100 UNIT/ML injection pen INJECT 1O UNITS SUBCUTANEOUSLY AT BEDTIME  Qty: 15 pen, Refills: 5      insulin lispro (HUMALOG KWIKPEN) 100 UNIT/ML pen Inject 30 Units into the skin 3 times daily (before meals)  Qty: 5 pen, Refills: 3    Associated Diagnoses: Diabetes mellitus type 2 in nonobese ([facility])      metoprolol succinate (TOPROL XL) 100 MG extended release tablet TAKE 1 TABLET BY MOUTH EVERY DAY  Qty: 90 tablet, Refills: 3      SYNTHROID 125 MCG tablet TAKE 1 TABLET BY MOUTH EVERY DAY BEFORE BREAKFAST  Qty: 90 tablet, Refills: 1    Associated Diagnoses: Hypothyroidism, adult      fenofibrate 160 MG tablet TAKE 1 TABLET BY MOUTH EVERY DAY  Qty: 90 tablet, Refills: 1      Continuous Blood Gluc Sensor (FREESTYLE SHEILA SENSOR SYSTEM) MISC USE TO CHECK SUGAR BEFORE MEALS AND AT BEDTIME  Qty: 1 each, Refills: 4    Associated Diagnoses: Type 2 diabetes mellitus with stage 2 chronic kidney disease, with long-term current use of insulin ([facility])      ! ! oxyCODONE HCl (OXY-IR) 10 MG immediate release tablet Take 10 mg by mouth every 8 hours as needed for Pain.      nabumetone (RELAFEN) 500 MG tablet TAKE 1 TABLET BY MOUTH TWICE A DAY AS NEEDED  Qty: 180 tablet, Refills: 1      !! blood glucose test strips (ASCENSIA AUTODISC VI;ONE TOUCH ULTRA TEST VI) strip 1 each by In Vitro route daily QD testing  Qty: 150 each, Refills: 1    Associated Diagnoses: Type 2 diabetes mellitus with stage 2 chronic kidney disease, with long-term current use of insulin ([facility]      ! ! blood glucose monitor strips Check sugars qac and hs  Qty: 150 strip, Refills: 5    Associated Diagnoses: Diabetes mellitus type 2 in nonobese ([facility])      Continuous Blood Gluc  (FREESTYLE SHEILA READER) KELLY Check sugars qac and hs  Qty: 1 Device, Refills: 0    Associated Diagnoses: Type 2 diabetes mellitus with stage 2 chronic kidney disease, with long-term current use of insulin ([facility])      Insulin Pen Needle 31G X 5 MM MISC qac and HS  Qty: 150 each, Refills: 5    Associated Diagnoses: Diabetes mellitus type 2 in nonobese (HCC)      Lancets MISC Check sugars qac and hs  Qty: 150 each, Refills: 3    Associated Diagnoses: Diabetes mellitus type 2 in nonobese (HCC)      predniSONE (DELTASONE) 10 MG tablet Take 1 tablet by mouth daily  Qty: 90 tablet, Refills: 3    Associated Diagnoses: Bilateral shoulder pain, unspecified chronicity      Blood Glucose Monitoring Suppl KELLY Check blood sugars BID prior to breakfast and 2 hours after largest meal and prn feeling \"bad\". DM 2 ( E11.9)  Qty: 1 Device, Refills: 0    Associated Diagnoses: Diabetes mellitus type 2 in nonobese (HCC)      lidocaine-prilocaine (EMLA) 2.5-2.5 % cream Apply topically as needed for Pain Apply a thin layer 30 minutes prior to treatment and cover. Qty: 1 Tube, Refills: 1    Associated Diagnoses: Leiomyosarcoma ([address]      Multiple Vitamins-Minerals (MULTIVITAMIN PO) Take 1 tablet by mouth daily        ! ! - Potential duplicate medications found. Please discuss with provider. Time Spent on discharge is more than 30 minutes in the examination, evaluation, counseling and review of medications and discharge plan with the patient. Family was notified regarding discharge. The patient Frandy Smiley was advised to consult their PCP/ or call 911 to proceed to nearest ED in the event if symptoms are not getting better and are getting worse . This chart was generated in part by using Dragon Dictation system and may contain errors related to that system including errors in grammar, punctuation, and spelling, as well as words and phrases that may be inappropriate. When dictating, effort is made to correct spelling/grammar errors. If there are any questions or concerns please feel free to contact the dictating provider for clarification. Signed:    Demetrius Cochran MD   6/6/2019      Thank you Ilana Whitfield for the opportunity to be involved in this patient's care.  If you have any questions or concerns please feel free to contact me at [phone].

## 2019-06-06 NOTE — PROGRESS NOTES
Patient's pain has been improving over night. Patient better able to lift right leg onto bed and is tolerating ambulation to/from the bathroom. VSS, neuro at baseline, and pain will continue to be managed per the [facility]. Patient ambulated in the hallway with [employer] student and tolerated very well. Will continue to monitor.

## 2019-06-06 NOTE — CARE COORDINATION
CTC attempted to meet with the Pt to complete the Inpatient Interview. Pt is with staff nurse and CTC will attempt to meet with the PT at a later time. PRIMITIVO WinslowN, RN  Care Transition Coordinator  Contact Number:  [phone]

## 2019-06-06 NOTE — ONCOLOGY
[address]    [phone]    DATE: 6-6-19    AREA TREATED: T2-T6/ RIGHT HIP & PELVIS/ RT FLANK    DAILY DOSE: 200cGy/300CGY/300CGY    CUMULATIVE DOSE:400 cGy/300CGY/300CGY    # 1  OUT OF 15  TREATMENTS    NEXT PLANNED TREATMENT  DATE: 6-7-19    Daily Treatments are Monday through Friday:       INPATIENT CODING:  Beam Radiation   Photons   Photon energy :  >10mv

## 2019-06-06 NOTE — PROGRESS NOTES
Physical Therapy    Facility/Department: [facility] 5T ORTHO/NEURO  Initial Assessment/Treatment note    NAME: Aayush Hernandes  : 1955  MRN: [id number]    Date of Service: 2019    Discharge Recommendations:Maryam Humphrey scored a 18/24 on the AM-PAC short mobility form. Current research shows that an AM-PAC score of 18 or greater is typically associated with a discharge to the patient's home setting. Based on the patients AM-PAC score and their current functional mobility deficits, it is recommended that the patient have 2-3 sessions per week of Physical Therapy at d/c to increase the patients independence. PT Equipment Recommendations  Equipment Needed: No    Assessment   Body structures, Functions, Activity limitations: Decreased functional mobility ; Decreased strength;Decreased balance; Increased Pain  Assessment: Pt is a 74 yo female who is currently functioning below her baseline due to new onset R LE pain and weakness. PT currently requiring the use of RW and supervision for ambulation and requires physical asssitance for bed mobility. Pt would benefit from continued therapy upon d/c to increase her independence with mobility. Treatment Diagnosis: Decreased independence with functional mobility. Prognosis: Good  Decision Making: Medium Complexity  Patient Education: Pt educated on PT POC, goals for hospital stay, and d/c recs. Lengthy discussion regarding DME with pt and that utilizing a borrowed w/c or buying a transport chair may be a better option at this time. Then exploring specialized w/c if neurologic symptoms worsen. Pt verbalized understanding. REQUIRES PT FOLLOW UP: Yes  Activity Tolerance  Activity Tolerance: Patient limited by endurance; Patient limited by pain       Patient Diagnosis(es): The encounter diagnosis was Leiomyosarcoma ([facility]      has a past medical history of Arthritis, Cancer ([facility] Diabetes mellitus ([facility] GERD (gastroesophageal reflux disease), Hyperlipidemia, and Hypertension. has a past surgical history that includes Tunneled venous port placement; tumor removal; Cholecystectomy;  section; and Dilation and curettage of uterus. Restrictions  Position Activity Restriction  Other position/activity restrictions: Up with assist  Vision/Hearing        Subjective  General  Chart Reviewed: Yes  Additional Pertinent Hx: Pt is a 74 yo female who presented to the ED on 6/3  with complaints of body aches, right leg pain and right-sided chest pain and back pain. CXR: Moderate to large right lower and mid lung pulmonary opacity. Findings may represent atelectasis, pneumonia, and/or malignancy; CT Chest: Multiple various sized scattered pulmonary nodules and masses throughout; R Femur xray: neg fx; RLE doppler: neg DVT; MRI t and l-spine: Osseous metastatic disease at T3, T9 and S2; MRI Pelvis: Numerous metastatic lesions as described; radiation to T3 vertebral body metastatic lesion . PMH: OA, CA, DM, GERD, HLD, HTN. Referring Practitioner: Dr. Francisco Lu: Within Functional Limits  General Comment  Comments: Pt was supine in bed upon arrival. Pt agreeable to PT intervention  Subjective  Subjective: Pt states that she has difficulty moving R LE, but feels like when she is walking with the RW she is ok. She states that she just moves slow. Pain Screening  Patient Currently in Pain: Yes(Pt with c/o R LE pain and pain at various tumor sites. RN contacted at end of session for pain meds.)  Vital Signs  Patient Currently in Pain: Yes(Pt with c/o R LE pain and pain at various tumor sites.  RN contacted at end of session for pain meds.)       Orientation     Social/Functional History  Social/Functional History  Lives With: Spouse(Spouse is still working)  Type of Home: House  Home Layout: Two level, Bed/Bath upstairs(14 steps with 1-2 hand rails)  Home Access: Stairs to enter without rails  Entrance Stairs - Number of Steps: 2  Bathroom Shower/Tub: Walk-in shower, Shower chair with back  Bathroom Toilet: Handicap height  Bathroom Equipment: Grab bars around toilet(in main floor half bath)  Home Equipment: Rolling walker, 4 wheeled walker, Cane, Electric scooter  ADL Assistance: Independent  Homemaking Assistance: Independent(Splits with spouse)  Ambulation Assistance: Independent  Transfer Assistance: Independent  Active : Yes  Leisure & Hobbies: Enjoys golfing, painting and boating  Additional Comments: Denies any falls in the last 3 months  Cognition        Objective             Strength RLE  Comment: Grossly 3-/5 and painful  Strength LLE  Strength LLE: WFL        Bed mobility  Supine to Sit: Minimal assistance(for RLE)  Transfers  Sit to Stand: Stand by assistance(From bed, commode and chair; VC for hand placement)  Stand to sit: Stand by assistance(VC for hand placement)  Ambulation  Ambulation?: Yes  Ambulation 1  Surface: level tile  Device: Rolling Walker  Assistance: Supervision  Quality of Gait: Step through pattern. Decreased B step length and foot clearance. Fwd flexed posture. Decreased quan. No LOB noted  Distance: 2 x 15', 30', 150'  Comments: VC for upright posture with fwd gaze, positioning in the RW, and safety. Stairs/Curb  Stairs?: Yes  Stairs  # Steps : 4  Stairs Height: 6\"  Rails: Bilateral  Assistance: Contact guard assistance  Comment: Pt utilized a step to pattern. Therapist provided step by step verbal instruction for sequencing.                Plan   Plan  Times per week: 5-7  Current Treatment Recommendations: Strengthening, Gait Training, Balance Training, Functional Mobility Training, Transfer Training, Endurance Training, Patient/Caregiver Education & Training, Home Exercise Program, Stair training  Safety Devices  Type of devices: Left in chair, Call light within reach, Chair alarm in place, Nurse notified    G-Code       OutComes Score                                                  AM-PAC Score  AM-PAC Inpatient Mobility Raw Score : 18 (06/06/19 1633)  AM-PAC Inpatient T-Scale Score : 43.63 (06/06/19 1633)  Mobility Inpatient CMS 0-100% Score: 46.58 (06/06/19 1633)  Mobility Inpatient CMS G-Code Modifier : CK (06/06/19 1633)          Goals  Short term goals  Time Frame for Short term goals: by d/c  Short term goal 1: Pt will transfer supine <> sit independently  Short term goal 2: Pt will transfer sit <> stand MOD I  Short term goal 3: Pt will ambulate 200' MOD I with RW  Patient Goals   Patient goals : Increase her independence       Therapy Time   Individual Concurrent Group Co-treatment   Time In 1410         Time Out 1455         Minutes Nayla 207, PT    If pt d/c'd prior to next treatment, this note serves as a discharge note.

## 2019-06-06 NOTE — PROGRESS NOTES
Occupational Therapy   Occupational Therapy Initial Assessment/Tx Note  Date: 2019   Patient Name: Sheridan Dumont  MRN: [id number]     : 1955    Date of Service: 2019  Assessment: Functional independence is decreased from baseline secondary to pain and weakness related to widespread cancer. On eval, pt requires SBA with walker for majority of mobility and will use walker initially at home. She may require assist for lower body ADLs, which  can provide. Pt educated on various home modifications/equipment which may be needed in the future. Pt verbalized understanding. No current needs. Recommend home with initial 24 hr spvn. Discharge Recommendations: Sheridan Dumont scored a 20/24 on the AM-PAC ADL Inpatient form. Current research shows that an AM-PAC score of 18 or greater is typically associated with a discharge to the patient's home setting. Based on the patients AM-PAC score and their current ADL deficits, it is recommended that the patient have 2-3 sessions per week of Occupational Therapy at d/c to increase the patients independence. OT Equipment Recommendations  Equipment Needed: No    Assessment   Performance deficits / Impairments: Decreased functional mobility ; Decreased ADL status; Decreased endurance;Decreased high-level IADLs  Assessment: Functional independence is decreased from baseline secondary to pain and weakness related to widespread cancer. On eval, pt requires SBA with walker for majority of mobility and will use walker initially at home. She may require assist for lower body ADLs, which  can provide. Pt educated on various home modifications/equipment which may be needed in the future. Pt verbalized understanding. No current needs. Recommend home with initial 24 hr spvn.    Treatment Diagnosis: Decreased activity tolerance, impaired ADLs and mobility  Decision Making: Low Complexity  Patient Education: Role of OT, d/c planning, activity promotion - verb understanding  REQUIRES OT FOLLOW UP: Yes  Activity Tolerance  Activity Tolerance: Patient Tolerated treatment well  Activity Tolerance: Pain present, but improved since admit. Educated on appropriate activity levels - verb understanding  Safety Devices  Safety Devices in place: Yes  Type of devices: (left on stretcher for transport to radiation)         Treatment Diagnosis: Decreased activity tolerance, impaired ADLs and mobility      Restrictions  Position Activity Restriction  Other position/activity restrictions: Up with assist    Subjective   General  Chart Reviewed: Yes  Additional Pertinent Hx: 75 y.o. F to ED 6/3 w/ c/o body aches, right-sided chest pain and back pain. Hspital Course: CXR: Moderate to large right lower and mid lung pulmonary opacity.  Findings may represent atelectasis, pneumonia, and/or malignancy; CT Chest: Multiple various sized scattered pulmonary nodules and masses throughout; R Femur xray: neg fx; RLE doppler: neg DVT; MRI t and l-spine: Osseous metastatic disease at T3, T9 and S2; MRI Pelvis: Numerous metastatic lesions as described; radiation to T3 vertebral body metastatic lesion 6/6. 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 Family / Caregiver Present: No  Referring Practitioner: Dr. Basil Delgado  Diagnosis: Spinal Cord Compression  Subjective  Subjective: Pt in bed on entry. Requesting to use the restroom. Very pleasant and cooperative. Inquiring about home modifications in light of her progressive illness.    Pain Assessment  Pain Assessment: 0-10(yes, unrated pain at tumor sites, PCA to inform RN of request for pain meds)    Social/Functional History  Social/Functional History  Lives With: Spouse(Spouse is still working)  Type of Home: House  Home Layout: Two level, Bed/Bath upstairs(14 steps with 1-2 hand rails)  Home Access: Stairs to enter without rails  Entrance Stairs - Number of Steps: 2  Bathroom Shower/Tub: Walk-in shower, Shower chair with back  [employer]: Handicap height  Bathroom Equipment: Grab bars around toilet(in main floor half bath)  Home Equipment: Rolling walker, 4 wheeled walker, Cane, Electric scooter  ADL Assistance: Independent  Homemaking Assistance: Independent(Splits with spouse)  Ambulation Assistance: Independent  Transfer Assistance: Independent  Active : Yes  Leisure & Hobbies: Enjoys golfing, painting and boating  Additional Comments: Denies any falls in the last 3 months     Objective   Vision: Within Functional Limits  Hearing: Within functional limits    Orientation  Overall Orientation Status: Within Functional Limits     Balance  Sitting Balance: Independent  Standing Balance: Supervision(static)  Standing Balance  Time: 1 min + 1 min + 4 min + 1 min   Activity: functional mobility in room, bathroom, tirado  Comment: spvn static stance, SBA gait and transfers with rolling walker. Experiences occasional sharp pain during gait, but no LOB or buckling noted with onset. Toilet Transfers  Toilet - Technique: Ambulating  Equipment Used: Standard toilet  Toilet Transfer: Stand by assistance(to spvn)  Toilet Transfers Comments: with grab bar  ADL  Feeding: Independent  Grooming: Independent  LE Dressing: Other (Comment)(not assessed - anticipate min to mod assist due to RLQ/tumor site pain and difficulty lifting RLE)  Toileting: Supervision  Additional Comments: Pt educated on various options for home modification including grab bars, stair lifts, elevators, etc. Pt verbalized understanding. Bed mobility  Supine to Sit: Minimal assistance(for RLE)  Sit to Supine: Minimal assistance(for RLE)  Comment: Pt educated on leg  if needed,  can assist for now. Transfers  Sit to stand: Supervision  Stand to sit: Supervision     Cognition  Overall Cognitive Status: [facility]     Plan  If pt discharges prior to next tx, this note will serve as d/c summary.  Continue per POC if pt does not d/c.     Plan  Times per week: 2-5x  Times per day: Daily  Current Treatment Recommendations: Functional Mobility Training, Endurance Training, Safety Education & Training, Patient/Caregiver Education & Training, Self-Care / ADL    AM-PAC Score  AM-PAC Inpatient Daily Activity Raw Score: 20 (06/06/19 1508)  AM-PAC Inpatient ADL T-Scale Score : 42.03 (06/06/19 1508)  ADL Inpatient CMS 0-100% Score: 38.32 (06/06/19 1508)  ADL Inpatient CMS G-Code Modifier : Michelle Collins (06/06/19 1508)    Goals  Short term goals  Time Frame for Short term goals: by D/C  Short term goal 1: Complete toileting and toilet transfer mod I  - Not met  Short term goal 2:  Increase standing/mobility tolerance to 10 min - Not met  Short term goal 3: [employer] brief and pants mod I, AE as needed - Not met  Patient Goals   Patient goals : to go home, decrease pain, maintain/improve mobility        Therapy Time   Individual Concurrent Group Co-treatment   Time In 1420         Time Out 1506         Minutes 46          Timed Code Tx Min: 31  Total Tx Time: [address], OT

## 2019-06-06 NOTE — PLAN OF CARE
Problem: Pain:  Goal: Pain level will decrease  Description  Pain level will decrease  Outcome: Ongoing  Note:   Patient's pain will continue to improve and be managed per the [facility]. Problem: Pain:  Goal: Control of chronic pain  Description  Control of chronic pain  Outcome: Ongoing  Note:   Patient's     Problem: Falls - Risk of:  Goal: Will remain free from falls  Description  Will remain free from falls  6/6/2019 0552 by Juanita Adamson RN  Outcome: Ongoing  Note:   Patient will remain free from falls. Problem: Falls - Risk of:  Goal: Absence of physical injury  Description  Absence of physical injury  Outcome: Ongoing  Note:   Patient will remain free from physical injury.

## 2019-06-06 NOTE — PROGRESS NOTES
Clinical Pharmacy Progress Note    ADMIT DATE: 6/3/2019     Estimated Creatinine Clearance: 55 mL/min (based on SCr of 0.9 mg/dL). VITALS:  BP (!) 151/81   Pulse 96   Temp 98.5 °F (36.9 °C) (Oral)   Resp 17   Ht 5' 5\" (1.651 m)   Wt 147 lb 4.3 oz (66.8 kg)   SpO2 98%   BMI 24.51 kg/m²     Intake/Output Summary (Last 24 hours) at 6/6/2019 1022  Last data filed at 6/6/2019 0914  Gross per 24 hour   Intake 1440 ml   Output --   Net 1440 ml       DIET:  The patient is asked to make an attempt to improve diet and exercise patterns to aid in medical management of this problem. ASSESSMENT/PLAN:  1)  IV To Po Conversion:   · Will convert pantoprazole 40 mg IV daily to 40 mg PO daily based on IV to PO policy (see below). · Criteria for changing to oral: tolerating other oral medications and oral supplements     Please call with questions --   Ulisses Melendrez, PharmD  [address] [facility] Pharmacist  [id number]  6/6/2019 10:22 AM        Patient Selection   A. Able to tolerate oral / NG medications, diet, or tube feeding    B. Exhibits signs of clinical improvement specific to the drug therapy to be switched. C. Criteria required for IV antibiotics prior to conversion to an oral equivalent   Temp is trending down and is < 100°F (37.8°C) or patient is afebrile   WBC is normalizing   Stable or improving radiographic findings (if applicable)   D. Professional judgment of the pharmacist indicates that PO medications are appropriate for the patient   E.  Exclusion Criteria    NPO   Not tolerating feeding (e.g. nausea, vomiting, diarrhea, large residuals on tube feeding)   Continuous NG suctioning   Active GI Bleed (specific for H2 Blockers and PPIs)   Specific for antibiotic conversion - the following diagnoses are excluded:   - Neutropenia (ANC ? 500)   - Meningitis  - Endocarditis  - Undrained abscess   - Orbital cellulites, endopthalmitis  - Initial treatment of osteomyelitis  - Cystic fibrosis patient

## 2019-06-07 LAB
BLOOD CULTURE, ROUTINE: NORMAL
CULTURE, BLOOD 2: NORMAL

## 2019-06-09 NOTE — DISCHARGE SUMMARY
Hospital Medicine Discharge Summary    Patient ID: Lalo Martin      Patient's PCP: Scooter Paige    Admit Date: 6/2/2019     Discharge Date: 6/3/2019      Admitting Physician: Trent Warren MD     Discharge Physician: Ana Cristina Ortega MD     Discharge Diagnoses: Active Hospital Problems    Diagnosis    Chest pain [R07.9]       The patient was seen and examined on day of discharge and this discharge summary is in conjunction with any daily progress note from day of discharge. Hospital Course: 71yo woman with recently diagnosed leiomyosarcoma, presented with chest pain, back pain and RLE weakness. RLE motor deficit - suspect due to extensive metastatic disease.      MRI T and L spine - multilevel spinal stenosis with ?cord compression.      Cont pain control. Ask neurosurgery to eval - Dr Nohemy Machado requested Tx to [facility] for further evaluation. Ask HemOnc to eval ( pt now follows at [facility] for clinical trials, but used to follow with Dr Bonnee Goldmann)  ? if she may be a candidate for rad onc eval and treatment.         Overall prognosis grim. After discussion with neurosurgery and Hospitalist team at [facility], pt was transferred to [facility] for neurosurgery evaluation and further management. I discussed this with Pt and her  prior to transfer. Physical Exam Performed:     BP (!) 176/69   Pulse 75   Temp 98 °F (36.7 °C) (Oral)   Resp 16   Ht 5' 5\" (1.651 m)   Wt 145 lb 15.1 oz (66.2 kg)   SpO2 94%   BMI 24.29 kg/m²        General appearance: No apparent distress, appears stated age and cooperative. HEENT: Pupils equal, round, and reactive to light. Conjunctivae/corneas clear. Neck: Supple, with full range of motion. No jugular venous distention. Trachea midline. Respiratory:  Normal respiratory effort. Clear to auscultation, bilaterally without Rales/Wheezes/Rhonchi. Cardiovascular: Regular rate and rhythm with normal S1/S2 without murmurs, rubs or gallops. reproduciable chest pain  Abdomen: Soft, non-tender, non-distended with normal bowel sounds. Musculoskeletal: No clubbing, cyanosis or edema bilaterally. Full range of motion without deformity. Skin: Skin color, texture, turgor normal.  No rashes or lesions. Neurologic:  Right leg strength 3/5. Cranial nerves: II-XII intact, grossly non-focal.  Psychiatric: Alert and oriented, thought content appropriate, normal insight  Capillary Refill: Brisk,< 3 seconds   Peripheral Pulses: +2 palpable, equal bilaterally          Labs: For convenience and continuity at follow-up the following most recent labs are provided:      CBC:    Lab Results   Component Value Date    WBC 8.5 06/03/2019    HGB 11.1 06/03/2019    HCT 34.5 06/03/2019     06/03/2019       Renal:    Lab Results   Component Value Date     06/03/2019    K 4.3 06/03/2019    CL 99 06/03/2019    CO2 23 06/03/2019    BUN 23 06/03/2019    CREATININE 0.9 06/03/2019    CALCIUM 9.5 06/03/2019         Significant Diagnostic Studies    Radiology:   MRI THORACIC SPINE WO CONTRAST   Final Result   1. Osseous metastatic disease at T3, T9 and S2.   2. Severe spinal canal stenosis at T8-9 and T11-12 secondary to 4 mm left   paracentral disc protrusions at both of these levels. There is mild   increased T2 signal intensity within the thoracic spinal cord suggesting   compressive edema at these levels. 3. Extraosseous extension of tumor at T3 extending into the prevertebral   space and ventral epidural space. There is resulting mild spinal canal   stenosis. 4. Intramuscular metastatic disease within the right paraspinal musculature   at T4-5 as well as the right psoas muscle. 5. Redemonstration of diffuse hepatic metastatic disease, incompletely imaged. 6. Multilevel degenerative changes of the lumbar spine most pronounced at   L4-5 with a disc bulge and facet arthropathy results in moderate spinal canal   stenosis.    The findings were sent to the Radiology Results chest, left supraclavicular space, right breast, and right lateral   chest wall. 7. Osseous metastatic disease within the thoracic spine, as detailed above,   with associated pathologic fracture of T3 and paraspinal extension. RECOMMENDATIONS:   Suggest comparison with any prior outside studies to assess for the tempo and   progression of the patient's metastatic disease. Additionally, consider a   follow-up CT abdomen/pelvis with IV and oral contrast for more detailed   evaluation of the patient's abdominal findings. XR CHEST STANDARD (2 VW)   Final Result   1. Moderate to large right lower and mid lung pulmonary opacity. Findings   may represent atelectasis, pneumonia, and/or malignancy. Recommend   correlation with previous available imaging, given reported history of cancer. 2. Mild vertebral compression deformity of upper thoracic vertebral body   appears chronic. There is sclerosis associated with this vertebral body. Osseous metastases are not excluded. Correlate with previous available   imaging. 3. No evidence of significant pleural effusion. No evidence of pneumothorax. Consults:     IP CONSULT TO HEM/ONC  IP CONSULT TO NEUROSURGERY    Disposition:  Sonny Zelaya  Condition at Discharge: Stable    Code Status:  Prior     Activity: activity as tolerated    Diet: regular diet      Discharge Medications:     Discharge Medication List as of 6/3/2019 11:38 PM           Details   Niraparib Tosylate 100 MG CAPS Take 2 capsules by mouth nightlyHistorical Med      oxyCODONE (OXYCONTIN) 20 MG extended release tablet Take 20 mg by mouth every 12 hours. Historical Med      fenofibrate 160 MG tablet TAKE 1 TABLET BY MOUTH EVERY DAY, Disp-90 tablet, R-1Normal      Continuous Blood Gluc Sensor (FREESTYLE SHEILA SENSOR SYSTEM) [facility] USE TO CHECK SUGAR BEFORE MEALS AND AT BEDTIME, Disp-1 each, R-4Normal      JARDIANCE 25 MG tablet TAKE 1 TABLET BY MOUTH EVERY DAY, Disp-30 tablet, Potential duplicate medications found. Please discuss with provider. Time Spent on discharge is more than 30 minutes in the examination, evaluation, counseling and review of medications and discharge plan. Signed:    Santy Cabrera MD   6/9/2019      Thank you Dana Gao for the opportunity to be involved in this patient's care. If you have any questions or concerns please feel free to contact me at [phone].

## 2019-06-18 DIAGNOSIS — M25.511 BILATERAL SHOULDER PAIN, UNSPECIFIED CHRONICITY: ICD-10-CM

## 2019-06-18 DIAGNOSIS — M25.512 BILATERAL SHOULDER PAIN, UNSPECIFIED CHRONICITY: ICD-10-CM

## 2019-06-19 RX ORDER — PREDNISONE 10 MG/1
TABLET ORAL
Qty: 90 TABLET | Refills: 3 | Status: SHIPPED | OUTPATIENT
Start: 2019-06-19

## 2019-07-05 ENCOUNTER — HOSPITAL ENCOUNTER (OUTPATIENT)
Dept: NON INVASIVE DIAGNOSTICS | Age: 64
Discharge: HOME | End: 2019-07-05
Payer: MEDICARE

## 2019-07-05 PROCEDURE — 93306 TTE W/DOPPLER COMPLETE: CPT

## 2019-07-08 ENCOUNTER — OFFICE VISIT (OUTPATIENT)
Dept: INTERNAL MEDICINE CLINIC | Age: 64
End: 2019-07-08
Payer: MEDICARE

## 2019-07-08 VITALS
WEIGHT: 144.4 LBS | DIASTOLIC BLOOD PRESSURE: 64 MMHG | HEART RATE: 76 BPM | SYSTOLIC BLOOD PRESSURE: 98 MMHG | OXYGEN SATURATION: 100 % | BODY MASS INDEX: 24.03 KG/M2

## 2019-07-08 DIAGNOSIS — Z79.4 TYPE 2 DIABETES MELLITUS WITH STAGE 2 CHRONIC KIDNEY DISEASE, WITH LONG-TERM CURRENT USE OF INSULIN (HCC): ICD-10-CM

## 2019-07-08 DIAGNOSIS — D69.6 THROMBOCYTOPENIA (HCC): ICD-10-CM

## 2019-07-08 DIAGNOSIS — C49.9 LEIOMYOSARCOMA (HCC): ICD-10-CM

## 2019-07-08 DIAGNOSIS — N18.2 TYPE 2 DIABETES MELLITUS WITH STAGE 2 CHRONIC KIDNEY DISEASE, WITH LONG-TERM CURRENT USE OF INSULIN (HCC): ICD-10-CM

## 2019-07-08 DIAGNOSIS — I10 ESSENTIAL HYPERTENSION: Primary | Chronic | ICD-10-CM

## 2019-07-08 DIAGNOSIS — R29.898 RIGHT LEG WEAKNESS: ICD-10-CM

## 2019-07-08 DIAGNOSIS — E11.22 TYPE 2 DIABETES MELLITUS WITH STAGE 2 CHRONIC KIDNEY DISEASE, WITH LONG-TERM CURRENT USE OF INSULIN (HCC): ICD-10-CM

## 2019-07-08 DIAGNOSIS — E03.9 HYPOTHYROIDISM, ADULT: ICD-10-CM

## 2019-07-08 PROCEDURE — 1123F ACP DISCUSS/DSCN MKR DOCD: CPT | Performed by: INTERNAL MEDICINE

## 2019-07-08 PROCEDURE — 3017F COLORECTAL CA SCREEN DOC REV: CPT | Performed by: INTERNAL MEDICINE

## 2019-07-08 PROCEDURE — G8427 DOCREV CUR MEDS BY ELIG CLIN: HCPCS | Performed by: INTERNAL MEDICINE

## 2019-07-08 PROCEDURE — 1036F TOBACCO NON-USER: CPT | Performed by: INTERNAL MEDICINE

## 2019-07-08 PROCEDURE — 2022F DILAT RTA XM EVC RTNOPTHY: CPT | Performed by: INTERNAL MEDICINE

## 2019-07-08 PROCEDURE — 99214 OFFICE O/P EST MOD 30 MIN: CPT | Performed by: INTERNAL MEDICINE

## 2019-07-08 PROCEDURE — 4040F PNEUMOC VAC/ADMIN/RCVD: CPT | Performed by: INTERNAL MEDICINE

## 2019-07-08 PROCEDURE — 3045F PR MOST RECENT HEMOGLOBIN A1C LEVEL 7.0-9.0%: CPT | Performed by: INTERNAL MEDICINE

## 2019-07-08 PROCEDURE — G8400 PT W/DXA NO RESULTS DOC: HCPCS | Performed by: INTERNAL MEDICINE

## 2019-07-08 PROCEDURE — 1090F PRES/ABSN URINE INCON ASSESS: CPT | Performed by: INTERNAL MEDICINE

## 2019-07-08 PROCEDURE — G8420 CALC BMI NORM PARAMETERS: HCPCS | Performed by: INTERNAL MEDICINE

## 2019-07-08 ASSESSMENT — ENCOUNTER SYMPTOMS
SHORTNESS OF BREATH: 1
DIARRHEA: 1
NAUSEA: 0
CONSTIPATION: 1

## 2019-07-08 NOTE — PATIENT INSTRUCTIONS
Decrease Lantus to 10 units at bedtime. Add a snack at bedtime for sugars under 140. Eat cheese and crackers or peanut butter with a little bit of jelly on wheat bread or cottage cheese to keep your sugars up. Humalog sliding scale:  Under 100 nothing  100-120  2 units  121-150    4 units  151-200 6 units  201-250 8 units  251-300 10 units  301-350 12 units  351-400 14 units  Over 400 16 units    Ask oncology to check you blood pressure manually.

## 2019-07-08 NOTE — ASSESSMENT & PLAN NOTE
Likely related to previous chemotherapy. Reviewed with patient and her  that there is nothing she can do supplement wise to increase her platelets.

## 2019-07-08 NOTE — LETTER
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 She would benefit greatly from an in-home elevator. This would help with independent mobility. If you have any questions or concerns, please don't hesitate to call.     Respectfully,      MANUEL HERNANDEZ, DO

## 2019-07-08 NOTE — PROGRESS NOTES
EVERY DAY 90 tablet 3    naloxone (NARCAN) 4 MG/0.1ML LIQD nasal spray 1 spray by Nasal route as needed for Opioid Reversal 1 each 3    lisinopril (PRINIVIL;ZESTRIL) 10 MG tablet Take 1 tablet by mouth daily 30 tablet 3    naloxegol (MOVANTIK) 25 MG TABS tablet Take 1 tablet by mouth every morning 30 tablet 0    pantoprazole (PROTONIX) 40 MG tablet Take 1 tablet by mouth every morning (before breakfast) 30 tablet 3    naloxone 4 MG/0.1ML LIQD nasal spray 1 spray by Nasal route as needed for Opioid Reversal 1 each 5    naloxone 4 MG/0.1ML LIQD nasal spray 1 spray by Nasal route as needed for Opioid Reversal 1 each 5    SYNTHROID 125 MCG tablet TAKE 1 TABLET BY MOUTH EVERY DAY BEFORE BREAKFAST 90 tablet 1    Niraparib Tosylate 100 MG CAPS Take 2 capsules by mouth nightly      fenofibrate 160 MG tablet TAKE 1 TABLET BY MOUTH EVERY DAY 90 tablet 1    Continuous Blood Gluc Sensor (FREESTYLE SHEILA SENSOR SYSTEM) MISC USE TO CHECK SUGAR BEFORE MEALS AND AT BEDTIME 1 each 4    JARDIANCE 25 MG tablet TAKE 1 TABLET BY MOUTH EVERY DAY 30 tablet 4    gabapentin (NEURONTIN) 100 MG capsule Take 100 mg by mouth 2 times daily.  oxyCODONE HCl (OXY-IR) 10 MG immediate release tablet Take 10 mg by mouth every 8 hours as needed for Pain.       nabumetone (RELAFEN) 500 MG tablet TAKE 1 TABLET BY MOUTH TWICE A DAY AS NEEDED 180 tablet 1    blood glucose test strips (ASCENSIA AUTODISC VI;ONE TOUCH ULTRA TEST VI) strip 1 each by In Vitro route daily QD testing 150 each 1    blood glucose monitor strips Check sugars qac and hs 150 strip 5    LANTUS SOLOSTAR 100 UNIT/ML injection pen INJECT 1O UNITS SUBCUTANEOUSLY AT BEDTIME 15 pen 5    Continuous Blood Gluc  (FREESTYLE SHEILA READER) KELLY Check sugars qac and hs 1 Device 0    insulin lispro (HUMALOG KWIKPEN) 100 UNIT/ML pen Inject 30 Units into the skin 3 times daily (before meals) (Patient taking differently: Inject into the skin 3 times daily (before meals) and no thyromegaly present. Cardiovascular: Normal rate, regular rhythm, normal heart sounds and intact distal pulses. No murmur heard. Pulses:       Dorsalis pedis pulses are 2+ on the right side, and 2+ on the left side. No LE edema   Pulmonary/Chest: Effort normal and breath sounds normal.   Musculoskeletal:   Large superficial tumor RLQ area. Lymphadenopathy:     She has no cervical adenopathy. Neurological: She is alert. Psychiatric: She has a normal mood and affect. Her behavior is normal.       ASSESSMENT/PLAN:    Problem List Items Addressed This Visit     Essential hypertension - Primary (Chronic)     Low today but reviewed BP's from oncology and they have been normal to high. Ask oncology to check BP's manually. Continue metoprolol  mg qd. May need to decrease the dose. Hypothyroidism, adult     Remains on Synthroid 125 mcg daily. Check TSH with labs in oncology next week. Relevant Orders    TSH with Reflex    Leiomyosarcoma ([address]     Disease continues to progress. She is now following with Dr. Truong Byrd at the [facility]. She will be starting a new chemotherapy once her platelet count rises above 50,000. Relevant Orders    External Referral To Physical Therapy    Right leg weakness     Secondary to tumor from leiomyosarcoma. She did have radiation which did help a little bit. Order given for physical therapy. Relevant Orders    External Referral To Physical Therapy    Thrombocytopenia ([address]     Likely related to previous chemotherapy. Reviewed with patient and her  that there is nothing she can do supplement wise to increase her platelets. Type 2 diabetes mellitus with renal complication (HCC)     Decrease Lantus to 10 units nightly if she is having several morning sugars under 100. Advised to eat a snack high in fat and protein (such as cheese and crackers or peanut butter and crackers) for HS blood sugars under 150.   Adjusted Humalog sliding scale as below:  Humalog sliding scale:  Under 100 nothing  100-120  2 units  121-150    4 units  151-200 6 units  201-250 8 units  251-300 10 units  301-350 12 units  351-400 14 units  Over 400 16 units  Continue Jardiance 25 mg daily but if her blood pressure remains low and she continues to have low blood sugars, will discontinue Jardiance. CT of the abdomen and pelvis did show involvement of her pancreas with her leiomyosarcoma. This is likely contributing to difficulty getting her blood sugars under control. Current Outpatient Medications   Medication Sig Dispense Refill    predniSONE (DELTASONE) 10 MG tablet TAKE 1 TABLET BY MOUTH EVERY DAY 90 tablet 3    naloxone (NARCAN) 4 MG/0.1ML LIQD nasal spray 1 spray by Nasal route as needed for Opioid Reversal 1 each 3    lisinopril (PRINIVIL;ZESTRIL) 10 MG tablet Take 1 tablet by mouth daily 30 tablet 3    naloxegol (MOVANTIK) 25 MG TABS tablet Take 1 tablet by mouth every morning 30 tablet 0    pantoprazole (PROTONIX) 40 MG tablet Take 1 tablet by mouth every morning (before breakfast) 30 tablet 3    naloxone 4 MG/0.1ML LIQD nasal spray 1 spray by Nasal route as needed for Opioid Reversal 1 each 5    naloxone 4 MG/0.1ML LIQD nasal spray 1 spray by Nasal route as needed for Opioid Reversal 1 each 5    SYNTHROID 125 MCG tablet TAKE 1 TABLET BY MOUTH EVERY DAY BEFORE BREAKFAST 90 tablet 1    Niraparib Tosylate 100 MG CAPS Take 2 capsules by mouth nightly      fenofibrate 160 MG tablet TAKE 1 TABLET BY MOUTH EVERY DAY 90 tablet 1    Continuous Blood Gluc Sensor ([place] SHEILA SENSOR SYSTEM) [facility] USE TO CHECK SUGAR BEFORE MEALS AND AT BEDTIME 1 each 4    JARDIANCE 25 MG tablet TAKE 1 TABLET BY MOUTH EVERY DAY 30 tablet 4    gabapentin (NEURONTIN) 100 MG capsule Take 100 mg by mouth 2 times daily.  oxyCODONE HCl (OXY-IR) 10 MG immediate release tablet Take 10 mg by mouth every 8 hours as needed for Pain.       nabumetone

## 2019-07-08 NOTE — LETTER
[facility] Internal Medicine  [address] [address]  Phone: [phone]  Fax: [phone]    [facility],         2019    [address] [place] [address]      Dear Umesh Thrasher:    To Whom It May Concern:    My patient, Jael Ford,  1955, has metastatic Leiomyosarcoma, affecting her spine and legs resulting in decreased strength affecting her lungs which affects her exercise tolerance. She would benefit greatly from an in-home elevator. This would help with independent mobility. If you have any questions or concerns, please don't hesitate to call. Respectfully,      MANUEL HERNANDEZ, DO                     If you have any questions or concerns, please don't hesitate to call.     Sincerely,        [facility], DO

## 2019-07-15 LAB — TSH, 3RD GENERATION: 1.61 UIU/ML (ref 0.45–4.12)

## 2019-07-15 RX ORDER — METOPROLOL SUCCINATE 100 MG/1
TABLET, EXTENDED RELEASE ORAL
Qty: 90 TABLET | Refills: 3 | Status: SHIPPED | OUTPATIENT
Start: 2019-07-15 | End: 2019-10-10

## 2019-07-23 DIAGNOSIS — E11.9 DIABETES MELLITUS TYPE 2 IN NONOBESE (HCC): ICD-10-CM

## 2019-08-01 ENCOUNTER — CARE COORDINATION (OUTPATIENT)
Dept: CASE MANAGEMENT | Age: 64
End: 2019-08-01

## 2019-08-02 ENCOUNTER — CARE COORDINATION (OUTPATIENT)
Dept: CASE MANAGEMENT | Age: 64
End: 2019-08-02

## 2019-08-02 ENCOUNTER — TELEPHONE (OUTPATIENT)
Dept: INTERNAL MEDICINE CLINIC | Age: 64
End: 2019-08-02

## 2019-08-02 RX ORDER — ONDANSETRON 4 MG/1
4 TABLET, FILM COATED ORAL EVERY 8 HOURS PRN
Qty: 30 TABLET | Refills: 0 | Status: SHIPPED | OUTPATIENT
Start: 2019-08-02 | End: 2019-10-10 | Stop reason: ALTCHOICE

## 2019-08-05 ENCOUNTER — CARE COORDINATION (OUTPATIENT)
Dept: CASE MANAGEMENT | Age: 64
End: 2019-08-05

## 2019-08-05 DIAGNOSIS — N18.2 TYPE 2 DIABETES MELLITUS WITH STAGE 2 CHRONIC KIDNEY DISEASE, WITH LONG-TERM CURRENT USE OF INSULIN (HCC): ICD-10-CM

## 2019-08-05 DIAGNOSIS — E11.22 TYPE 2 DIABETES MELLITUS WITH STAGE 2 CHRONIC KIDNEY DISEASE, WITH LONG-TERM CURRENT USE OF INSULIN (HCC): ICD-10-CM

## 2019-08-05 DIAGNOSIS — Z79.4 TYPE 2 DIABETES MELLITUS WITH STAGE 2 CHRONIC KIDNEY DISEASE, WITH LONG-TERM CURRENT USE OF INSULIN (HCC): ICD-10-CM

## 2019-08-05 RX ORDER — FLASH GLUCOSE SENSOR
KIT MISCELLANEOUS
Qty: 2 EACH | Refills: 3 | Status: SHIPPED | OUTPATIENT
Start: 2019-08-05

## 2019-08-05 NOTE — CARE COORDINATION
[facility] Transitions Follow Up Call    2019    Patient: Zohra Ochoa  Patient : 1955   MRN: [id number]  Reason for Admission:   Discharge Date: 19 RARS: Readmission Risk Score: 16         Spoke with: [facility] Transitions Subsequent and Final Call    Subsequent and Final Calls  Do you have any ongoing symptoms?:  No  Have your medications changed?:  Yes  Do you have any questions related to your medications?:  No  Do you currently have any active services?:  No  Do you have any needs or concerns that I can assist you with?:  No  Identified Barriers:  None  Care Transitions Interventions  Other Interventions:          CTN spoke to Marcela for f/u transitions call. Stated pt started the Zofran and is able to eat and drink better since starting the medication. Pt has f/u oncology appt scheduled and pt will discuss chemo treatment plan at f/uvisit. Pt is having elevator installed in home. No immediate needs or concerns.     Falguni Dsouza RN BSN   Care Transitions Nurse  [phone]     Follow Up  Future Appointments   Date Time Provider Germain Lazaro   10/11/2019 11:40 AM DO SHARYN Jarquin [facility]       Falguni Dsouza RN

## 2019-08-14 ENCOUNTER — TELEPHONE (OUTPATIENT)
Dept: RHEUMATOLOGY | Age: 64
End: 2019-08-14

## 2019-09-16 ENCOUNTER — TELEPHONE (OUTPATIENT)
Dept: INTERNAL MEDICINE CLINIC | Age: 64
End: 2019-09-16

## 2019-09-19 RX ORDER — NABUMETONE 500 MG/1
TABLET, FILM COATED ORAL
Qty: 180 TABLET | Refills: 1 | Status: SHIPPED | OUTPATIENT
Start: 2019-09-19

## 2019-09-24 RX ORDER — LISINOPRIL 10 MG/1
TABLET ORAL
Qty: 90 TABLET | Refills: 1 | Status: SHIPPED | OUTPATIENT
Start: 2019-09-24 | End: 2019-10-10 | Stop reason: ALTCHOICE

## 2019-09-25 RX ORDER — EMPAGLIFLOZIN 25 MG/1
TABLET, FILM COATED ORAL
Qty: 30 TABLET | Refills: 4 | Status: SHIPPED | OUTPATIENT
Start: 2019-09-25 | End: 2019-10-10 | Stop reason: DRUGHIGH

## 2019-10-10 ENCOUNTER — OFFICE VISIT (OUTPATIENT)
Dept: INTERNAL MEDICINE CLINIC | Age: 64
End: 2019-10-10
Payer: MEDICARE

## 2019-10-10 VITALS — OXYGEN SATURATION: 98 % | HEART RATE: 80 BPM | BODY MASS INDEX: 22.93 KG/M2 | WEIGHT: 137.8 LBS

## 2019-10-10 DIAGNOSIS — E11.22 TYPE 2 DIABETES MELLITUS WITH STAGE 2 CHRONIC KIDNEY DISEASE, WITH LONG-TERM CURRENT USE OF INSULIN (HCC): ICD-10-CM

## 2019-10-10 DIAGNOSIS — Z79.4 TYPE 2 DIABETES MELLITUS WITH STAGE 2 CHRONIC KIDNEY DISEASE, WITH LONG-TERM CURRENT USE OF INSULIN (HCC): ICD-10-CM

## 2019-10-10 DIAGNOSIS — C49.9 LEIOMYOSARCOMA (HCC): ICD-10-CM

## 2019-10-10 DIAGNOSIS — N18.2 TYPE 2 DIABETES MELLITUS WITH STAGE 2 CHRONIC KIDNEY DISEASE, WITH LONG-TERM CURRENT USE OF INSULIN (HCC): ICD-10-CM

## 2019-10-10 DIAGNOSIS — I10 ESSENTIAL HYPERTENSION: Chronic | ICD-10-CM

## 2019-10-10 DIAGNOSIS — E78.5 HYPERLIPIDEMIA LDL GOAL <70: Primary | ICD-10-CM

## 2019-10-10 DIAGNOSIS — M79.89 SWELLING OF LOWER EXTREMITY: ICD-10-CM

## 2019-10-10 PROCEDURE — G0008 ADMIN INFLUENZA VIRUS VAC: HCPCS | Performed by: INTERNAL MEDICINE

## 2019-10-10 PROCEDURE — 3017F COLORECTAL CA SCREEN DOC REV: CPT | Performed by: INTERNAL MEDICINE

## 2019-10-10 PROCEDURE — 1123F ACP DISCUSS/DSCN MKR DOCD: CPT | Performed by: INTERNAL MEDICINE

## 2019-10-10 PROCEDURE — G8400 PT W/DXA NO RESULTS DOC: HCPCS | Performed by: INTERNAL MEDICINE

## 2019-10-10 PROCEDURE — 1036F TOBACCO NON-USER: CPT | Performed by: INTERNAL MEDICINE

## 2019-10-10 PROCEDURE — 2022F DILAT RTA XM EVC RTNOPTHY: CPT | Performed by: INTERNAL MEDICINE

## 2019-10-10 PROCEDURE — 1090F PRES/ABSN URINE INCON ASSESS: CPT | Performed by: INTERNAL MEDICINE

## 2019-10-10 PROCEDURE — G8427 DOCREV CUR MEDS BY ELIG CLIN: HCPCS | Performed by: INTERNAL MEDICINE

## 2019-10-10 PROCEDURE — 90653 IIV ADJUVANT VACCINE IM: CPT | Performed by: INTERNAL MEDICINE

## 2019-10-10 PROCEDURE — 99214 OFFICE O/P EST MOD 30 MIN: CPT | Performed by: INTERNAL MEDICINE

## 2019-10-10 PROCEDURE — G8420 CALC BMI NORM PARAMETERS: HCPCS | Performed by: INTERNAL MEDICINE

## 2019-10-10 PROCEDURE — 4040F PNEUMOC VAC/ADMIN/RCVD: CPT | Performed by: INTERNAL MEDICINE

## 2019-10-10 PROCEDURE — G8482 FLU IMMUNIZE ORDER/ADMIN: HCPCS | Performed by: INTERNAL MEDICINE

## 2019-10-10 RX ORDER — DIPHENHYDRAMINE HCL 25 MG
25 TABLET ORAL EVERY EVENING
COMMUNITY

## 2019-10-10 RX ORDER — FUROSEMIDE 20 MG/1
20 TABLET ORAL DAILY PRN
Refills: 2 | COMMUNITY
Start: 2019-07-23

## 2019-10-10 RX ORDER — METOPROLOL SUCCINATE 50 MG/1
50 TABLET, EXTENDED RELEASE ORAL DAILY
Qty: 30 TABLET | Refills: 5
Start: 2019-10-10

## 2019-10-10 RX ORDER — OXYCODONE HCL 20 MG/1
20 TABLET, FILM COATED, EXTENDED RELEASE ORAL 2 TIMES DAILY
COMMUNITY
Start: 2019-09-18 | End: 2019-10-18

## 2019-10-10 ASSESSMENT — ENCOUNTER SYMPTOMS
DIARRHEA: 0
CONSTIPATION: 0
CHEST TIGHTNESS: 0
SHORTNESS OF BREATH: 0

## 2021-07-28 NOTE — CARE COORDINATION
CTC attempted to meet with the Pt to complete the Inpatient Interview. Pt is with several visitors and CTC will attempt to meet with the Pt at a later time. NADINE Otero, RN  Care Transition Coordinator  Contact Number:  [phone] Cyclosporine Counseling:  I discussed with the patient the risks of cyclosporine including but not limited to hypertension, gingival hyperplasia,myelosuppression, immunosuppression, liver damage, kidney damage, neurotoxicity, lymphoma, and serious infections. The patient understands that monitoring is required including baseline blood pressure, CBC, CMP, lipid panel and uric acid, and then 1-2 times monthly CMP and blood pressure.